# Patient Record
Sex: FEMALE | Race: WHITE | NOT HISPANIC OR LATINO
[De-identification: names, ages, dates, MRNs, and addresses within clinical notes are randomized per-mention and may not be internally consistent; named-entity substitution may affect disease eponyms.]

---

## 2017-03-17 ENCOUNTER — RECORD ABSTRACTING (OUTPATIENT)
Age: 57
End: 2017-03-17

## 2017-03-17 DIAGNOSIS — Z78.9 OTHER SPECIFIED HEALTH STATUS: ICD-10-CM

## 2017-03-17 DIAGNOSIS — Z78.0 ASYMPTOMATIC MENOPAUSAL STATE: ICD-10-CM

## 2017-03-17 DIAGNOSIS — Z92.89 PERSONAL HISTORY OF OTHER MEDICAL TREATMENT: ICD-10-CM

## 2017-03-17 DIAGNOSIS — Z30.41 ENCOUNTER FOR SURVEILLANCE OF CONTRACEPTIVE PILLS: ICD-10-CM

## 2017-03-28 ENCOUNTER — APPOINTMENT (OUTPATIENT)
Dept: BREAST CENTER | Facility: CLINIC | Age: 57
End: 2017-03-28

## 2017-04-06 ENCOUNTER — APPOINTMENT (OUTPATIENT)
Dept: BREAST CENTER | Facility: CLINIC | Age: 57
End: 2017-04-06

## 2017-04-06 VITALS
DIASTOLIC BLOOD PRESSURE: 82 MMHG | SYSTOLIC BLOOD PRESSURE: 132 MMHG | WEIGHT: 270 LBS | HEIGHT: 63 IN | BODY MASS INDEX: 47.84 KG/M2

## 2017-04-06 RX ORDER — TRIAMCINOLONE ACETONIDE 1 MG/G
0.1 OINTMENT TOPICAL
Qty: 80 | Refills: 0 | Status: DISCONTINUED | COMMUNITY
Start: 2017-03-30 | End: 2017-04-06

## 2017-04-14 ENCOUNTER — APPOINTMENT (OUTPATIENT)
Dept: HEMATOLOGY ONCOLOGY | Facility: CLINIC | Age: 57
End: 2017-04-14

## 2017-04-14 ENCOUNTER — OUTPATIENT (OUTPATIENT)
Dept: OUTPATIENT SERVICES | Facility: HOSPITAL | Age: 57
LOS: 1 days | Discharge: HOME | End: 2017-04-14

## 2017-04-14 VITALS
RESPIRATION RATE: 14 BRPM | HEART RATE: 78 BPM | BODY MASS INDEX: 48.02 KG/M2 | TEMPERATURE: 97.9 F | DIASTOLIC BLOOD PRESSURE: 72 MMHG | WEIGHT: 271 LBS | HEIGHT: 63 IN | SYSTOLIC BLOOD PRESSURE: 153 MMHG

## 2017-04-14 DIAGNOSIS — M79.89 OTHER SPECIFIED SOFT TISSUE DISORDERS: ICD-10-CM

## 2017-06-27 DIAGNOSIS — D05.11 INTRADUCTAL CARCINOMA IN SITU OF RIGHT BREAST: ICD-10-CM

## 2017-07-07 ENCOUNTER — APPOINTMENT (OUTPATIENT)
Dept: HEMATOLOGY ONCOLOGY | Facility: CLINIC | Age: 57
End: 2017-07-07

## 2017-09-21 ENCOUNTER — APPOINTMENT (OUTPATIENT)
Dept: BREAST CENTER | Facility: CLINIC | Age: 57
End: 2017-09-21

## 2018-04-05 ENCOUNTER — APPOINTMENT (OUTPATIENT)
Dept: BREAST CENTER | Facility: CLINIC | Age: 58
End: 2018-04-05
Payer: COMMERCIAL

## 2018-04-05 VITALS
WEIGHT: 271 LBS | BODY MASS INDEX: 48.02 KG/M2 | OXYGEN SATURATION: 98 % | SYSTOLIC BLOOD PRESSURE: 130 MMHG | DIASTOLIC BLOOD PRESSURE: 72 MMHG | HEART RATE: 79 BPM | HEIGHT: 63 IN

## 2018-04-05 PROCEDURE — 99214 OFFICE O/P EST MOD 30 MIN: CPT

## 2018-04-16 ENCOUNTER — OUTPATIENT (OUTPATIENT)
Dept: OUTPATIENT SERVICES | Facility: HOSPITAL | Age: 58
LOS: 1 days | Discharge: HOME | End: 2018-04-16

## 2018-04-16 DIAGNOSIS — Z02.9 ENCOUNTER FOR ADMINISTRATIVE EXAMINATIONS, UNSPECIFIED: ICD-10-CM

## 2018-04-17 ENCOUNTER — OUTPATIENT (OUTPATIENT)
Dept: OUTPATIENT SERVICES | Facility: HOSPITAL | Age: 58
LOS: 1 days | Discharge: HOME | End: 2018-04-17

## 2018-04-17 VITALS
SYSTOLIC BLOOD PRESSURE: 146 MMHG | HEART RATE: 67 BPM | TEMPERATURE: 97 F | HEIGHT: 63 IN | RESPIRATION RATE: 20 BRPM | DIASTOLIC BLOOD PRESSURE: 72 MMHG | OXYGEN SATURATION: 97 % | WEIGHT: 277.78 LBS

## 2018-04-17 DIAGNOSIS — N60.92 UNSPECIFIED BENIGN MAMMARY DYSPLASIA OF LEFT BREAST: ICD-10-CM

## 2018-04-17 DIAGNOSIS — Z98.890 OTHER SPECIFIED POSTPROCEDURAL STATES: Chronic | ICD-10-CM

## 2018-04-17 DIAGNOSIS — Z01.818 ENCOUNTER FOR OTHER PREPROCEDURAL EXAMINATION: ICD-10-CM

## 2018-04-17 DIAGNOSIS — R89.7 ABNORMAL HISTOLOGICAL FINDINGS IN SPECIMENS FROM OTHER ORGANS, SYSTEMS AND TISSUES: ICD-10-CM

## 2018-04-17 LAB
ALBUMIN SERPL ELPH-MCNC: 4 G/DL — SIGNIFICANT CHANGE UP (ref 3.5–5.2)
ALP SERPL-CCNC: 92 U/L — SIGNIFICANT CHANGE UP (ref 30–115)
ALT FLD-CCNC: 24 U/L — SIGNIFICANT CHANGE UP (ref 0–41)
ANION GAP SERPL CALC-SCNC: 13 MMOL/L — SIGNIFICANT CHANGE UP (ref 7–14)
APTT BLD: 36.5 SEC — SIGNIFICANT CHANGE UP (ref 27–39.2)
AST SERPL-CCNC: 33 U/L — SIGNIFICANT CHANGE UP (ref 0–41)
BASOPHILS # BLD AUTO: 0.05 K/UL — SIGNIFICANT CHANGE UP (ref 0–0.2)
BASOPHILS NFR BLD AUTO: 0.8 % — SIGNIFICANT CHANGE UP (ref 0–1)
BILIRUB SERPL-MCNC: 1.4 MG/DL — HIGH (ref 0.2–1.2)
BUN SERPL-MCNC: 9 MG/DL — LOW (ref 10–20)
CALCIUM SERPL-MCNC: 9.2 MG/DL — SIGNIFICANT CHANGE UP (ref 8.5–10.1)
CHLORIDE SERPL-SCNC: 95 MMOL/L — LOW (ref 98–110)
CO2 SERPL-SCNC: 28 MMOL/L — SIGNIFICANT CHANGE UP (ref 17–32)
CREAT SERPL-MCNC: 0.5 MG/DL — LOW (ref 0.7–1.5)
EOSINOPHIL # BLD AUTO: 0.2 K/UL — SIGNIFICANT CHANGE UP (ref 0–0.7)
EOSINOPHIL NFR BLD AUTO: 3.2 % — SIGNIFICANT CHANGE UP (ref 0–8)
GLUCOSE SERPL-MCNC: 99 MG/DL — SIGNIFICANT CHANGE UP (ref 70–99)
HCT VFR BLD CALC: 43.7 % — SIGNIFICANT CHANGE UP (ref 37–47)
HGB BLD-MCNC: 15.6 G/DL — SIGNIFICANT CHANGE UP (ref 12–16)
IMM GRANULOCYTES NFR BLD AUTO: 0.3 % — SIGNIFICANT CHANGE UP (ref 0.1–0.3)
INR BLD: 1.25 RATIO — SIGNIFICANT CHANGE UP (ref 0.65–1.3)
LYMPHOCYTES # BLD AUTO: 1.41 K/UL — SIGNIFICANT CHANGE UP (ref 1.2–3.4)
LYMPHOCYTES # BLD AUTO: 22.7 % — SIGNIFICANT CHANGE UP (ref 20.5–51.1)
MCHC RBC-ENTMCNC: 29.5 PG — SIGNIFICANT CHANGE UP (ref 27–31)
MCHC RBC-ENTMCNC: 35.7 G/DL — SIGNIFICANT CHANGE UP (ref 32–37)
MCV RBC AUTO: 82.8 FL — SIGNIFICANT CHANGE UP (ref 81–99)
MONOCYTES # BLD AUTO: 1.01 K/UL — HIGH (ref 0.1–0.6)
MONOCYTES NFR BLD AUTO: 16.3 % — HIGH (ref 1.7–9.3)
NEUTROPHILS # BLD AUTO: 3.51 K/UL — SIGNIFICANT CHANGE UP (ref 1.4–6.5)
NEUTROPHILS NFR BLD AUTO: 56.7 % — SIGNIFICANT CHANGE UP (ref 42.2–75.2)
NRBC # BLD: 0 /100 WBCS — SIGNIFICANT CHANGE UP (ref 0–0)
PLATELET # BLD AUTO: 131 K/UL — SIGNIFICANT CHANGE UP (ref 130–400)
POTASSIUM SERPL-MCNC: 3.8 MMOL/L — SIGNIFICANT CHANGE UP (ref 3.5–5)
POTASSIUM SERPL-SCNC: 3.8 MMOL/L — SIGNIFICANT CHANGE UP (ref 3.5–5)
PROT SERPL-MCNC: 6.7 G/DL — SIGNIFICANT CHANGE UP (ref 6–8)
PROTHROM AB SERPL-ACNC: 13.6 SEC — HIGH (ref 9.95–12.87)
RBC # BLD: 5.28 M/UL — SIGNIFICANT CHANGE UP (ref 4.2–5.4)
RBC # FLD: 13.5 % — SIGNIFICANT CHANGE UP (ref 11.5–14.5)
SODIUM SERPL-SCNC: 136 MMOL/L — SIGNIFICANT CHANGE UP (ref 135–146)
SURGICAL PATHOLOGY STUDY: SIGNIFICANT CHANGE UP
WBC # BLD: 6.2 K/UL — SIGNIFICANT CHANGE UP (ref 4.8–10.8)
WBC # FLD AUTO: 6.2 K/UL — SIGNIFICANT CHANGE UP (ref 4.8–10.8)

## 2018-04-17 NOTE — H&P PST ADULT - PMH
DM (diabetes mellitus)    HTN (hypertension)    Obesity    Smoker DM (diabetes mellitus)    HTN (hypertension)    Obesity  bmi 47  Smoker

## 2018-04-17 NOTE — H&P PST ADULT - REASON FOR ADMISSION
Pt denies cp palp uri cough dysuria or sob. ET 1 FOS denies SOB . PT denies any open wounds, drainage or rashes. Pt denies cp palp uri cough dysuria o ET 1 FOS or 1 flat block  with  SOB . PT denies any open wounds, drainage or rashes. scheduled for breast procedure

## 2018-04-18 DIAGNOSIS — E66.9 OBESITY, UNSPECIFIED: ICD-10-CM

## 2018-04-18 DIAGNOSIS — R92.1 MAMMOGRAPHIC CALCIFICATION FOUND ON DIAGNOSTIC IMAGING OF BREAST: ICD-10-CM

## 2018-04-23 ENCOUNTER — APPOINTMENT (OUTPATIENT)
Dept: BREAST CENTER | Facility: AMBULATORY SURGERY CENTER | Age: 58
End: 2018-04-23
Payer: COMMERCIAL

## 2018-04-30 VITALS
WEIGHT: 270.95 LBS | HEIGHT: 63 IN | HEART RATE: 79 BPM | OXYGEN SATURATION: 98 % | DIASTOLIC BLOOD PRESSURE: 72 MMHG | SYSTOLIC BLOOD PRESSURE: 130 MMHG

## 2018-04-30 NOTE — H&P PST ADULT - PMH
DM (diabetes mellitus)    HTN (hypertension)    Lobular carcinoma in situ (LCIS) of right breast    Obesity  bmi 47  Smoker

## 2018-04-30 NOTE — H&P PST ADULT - HISTORY OF PRESENT ILLNESS
57 year old female with right breast LCIS classical type on stereotactic biopsy 3/15/18; 8:00 (aung).

## 2018-05-07 ENCOUNTER — OUTPATIENT (OUTPATIENT)
Dept: OUTPATIENT SERVICES | Facility: HOSPITAL | Age: 58
LOS: 1 days | Discharge: HOME | End: 2018-05-07

## 2018-05-07 ENCOUNTER — LABORATORY RESULT (OUTPATIENT)
Age: 58
End: 2018-05-07

## 2018-05-07 DIAGNOSIS — Z98.890 OTHER SPECIFIED POSTPROCEDURAL STATES: Chronic | ICD-10-CM

## 2018-05-07 DIAGNOSIS — R89.7 ABNORMAL HISTOLOGICAL FINDINGS IN SPECIMENS FROM OTHER ORGANS, SYSTEMS AND TISSUES: ICD-10-CM

## 2018-05-20 ENCOUNTER — FORM ENCOUNTER (OUTPATIENT)
Age: 58
End: 2018-05-20

## 2018-05-21 ENCOUNTER — APPOINTMENT (OUTPATIENT)
Dept: BREAST CENTER | Facility: AMBULATORY SURGERY CENTER | Age: 58
End: 2018-05-21
Payer: COMMERCIAL

## 2018-05-21 ENCOUNTER — OUTPATIENT (OUTPATIENT)
Dept: OUTPATIENT SERVICES | Facility: HOSPITAL | Age: 58
LOS: 1 days | Discharge: HOME | End: 2018-05-21

## 2018-05-21 ENCOUNTER — RESULT REVIEW (OUTPATIENT)
Age: 58
End: 2018-05-21

## 2018-05-21 VITALS
HEIGHT: 63 IN | TEMPERATURE: 98 F | RESPIRATION RATE: 18 BRPM | SYSTOLIC BLOOD PRESSURE: 136 MMHG | WEIGHT: 270.07 LBS | HEART RATE: 68 BPM | DIASTOLIC BLOOD PRESSURE: 63 MMHG

## 2018-05-21 VITALS
RESPIRATION RATE: 16 BRPM | DIASTOLIC BLOOD PRESSURE: 65 MMHG | TEMPERATURE: 98 F | HEART RATE: 68 BPM | OXYGEN SATURATION: 97 % | SYSTOLIC BLOOD PRESSURE: 119 MMHG

## 2018-05-21 DIAGNOSIS — Z98.890 OTHER SPECIFIED POSTPROCEDURAL STATES: Chronic | ICD-10-CM

## 2018-05-21 PROCEDURE — 19301 PARTIAL MASTECTOMY: CPT | Mod: RT

## 2018-05-21 RX ORDER — ONDANSETRON 8 MG/1
4 TABLET, FILM COATED ORAL ONCE
Qty: 0 | Refills: 0 | Status: DISCONTINUED | OUTPATIENT
Start: 2018-05-21 | End: 2018-06-05

## 2018-05-21 RX ORDER — ACETAMINOPHEN 500 MG
650 TABLET ORAL ONCE
Qty: 0 | Refills: 0 | Status: DISCONTINUED | OUTPATIENT
Start: 2018-05-21 | End: 2018-06-05

## 2018-05-21 RX ORDER — SODIUM CHLORIDE 9 MG/ML
1000 INJECTION, SOLUTION INTRAVENOUS
Qty: 0 | Refills: 0 | Status: DISCONTINUED | OUTPATIENT
Start: 2018-05-21 | End: 2018-06-05

## 2018-05-21 RX ORDER — ACETAMINOPHEN 500 MG
1000 TABLET ORAL ONCE
Qty: 0 | Refills: 0 | Status: DISCONTINUED | OUTPATIENT
Start: 2018-05-21 | End: 2018-06-05

## 2018-05-21 RX ORDER — KETOROLAC TROMETHAMINE 30 MG/ML
30 SYRINGE (ML) INJECTION ONCE
Qty: 0 | Refills: 0 | Status: DISCONTINUED | OUTPATIENT
Start: 2018-05-21 | End: 2018-05-21

## 2018-05-21 RX ADMIN — SODIUM CHLORIDE 50 MILLILITER(S): 9 INJECTION, SOLUTION INTRAVENOUS at 12:27

## 2018-05-21 NOTE — ASU DISCHARGE PLAN (ADULT/PEDIATRIC). - SPECIAL INSTRUCTIONS
Regular Diet.    No heavy lifting more than 15 pounds for two weeks.    Please remove plastic dressings in three days.  Leave white tape in place.  May shower tomorrow.  Wear a supportive bra.

## 2018-05-21 NOTE — CHART NOTE - NSCHARTNOTEFT_GEN_A_CORE
PACU ANESTHESIA ADMISSION NOTE      Procedure: Lumpectomy of breast with needle localization: Right breast; of two areas.    Post op diagnosis:  Lobular carcinoma in situ (LCIS) of right breast      ____  Intubated  TV:______       Rate: ______      FiO2: ______    _x___  Patent Airway    _x___  Full return of protective reflexes    _x___  Full recovery from anesthesia / back to baseline status    Vitals:  BP:  101/57               HR: 72  RR: 12  Temp: 98.1  O2Sat: 98    Mental Status:  _x___ Awake   _____ Alert   _____ Drowsy   _____ Sedated    Nausea/Vomiting:  _x___  NO       ______Yes,   See Post - Op Orders         Pain Scale (0-10):  __0___    Treatment: _x___ None    ____ See Post - Op/PCA Orders    Post - Operative Fluids:   __x__ Oral   ____ See Post - Op Orders    Plan: Discharge:   _x___Home       _____Floor     _____Critical Care    _____  Other:_________________    Comments:  Intraoperative course uneventful.  No anesthesia issues or complications noted.  Discharge when criteria met.

## 2018-05-21 NOTE — BRIEF OPERATIVE NOTE - PROCEDURE
<<-----Click on this checkbox to enter Procedure Lumpectomy of breast with needle localization  05/21/2018  Right breast; of two areas.  Active  [email or web address]

## 2018-05-25 ENCOUNTER — OUTPATIENT (OUTPATIENT)
Dept: OUTPATIENT SERVICES | Facility: HOSPITAL | Age: 58
LOS: 1 days | Discharge: HOME | End: 2018-05-25

## 2018-05-25 VITALS
OXYGEN SATURATION: 96 % | RESPIRATION RATE: 16 BRPM | SYSTOLIC BLOOD PRESSURE: 130 MMHG | DIASTOLIC BLOOD PRESSURE: 76 MMHG | HEART RATE: 68 BPM | WEIGHT: 270.07 LBS | TEMPERATURE: 98 F

## 2018-05-25 DIAGNOSIS — Z01.818 ENCOUNTER FOR OTHER PREPROCEDURAL EXAMINATION: ICD-10-CM

## 2018-05-25 DIAGNOSIS — Z98.890 OTHER SPECIFIED POSTPROCEDURAL STATES: Chronic | ICD-10-CM

## 2018-05-25 DIAGNOSIS — N85.00 ENDOMETRIAL HYPERPLASIA, UNSPECIFIED: ICD-10-CM

## 2018-05-25 LAB
APPEARANCE UR: (no result)
BACTERIA # UR AUTO: (no result) /HPF
BILIRUB UR-MCNC: NEGATIVE — SIGNIFICANT CHANGE UP
COLOR SPEC: YELLOW — SIGNIFICANT CHANGE UP
DIFF PNL FLD: NEGATIVE — SIGNIFICANT CHANGE UP
EPI CELLS # UR: (no result) /HPF
GLUCOSE UR QL: NEGATIVE MG/DL — SIGNIFICANT CHANGE UP
KETONES UR-MCNC: NEGATIVE — SIGNIFICANT CHANGE UP
LEUKOCYTE ESTERASE UR-ACNC: (no result)
NITRITE UR-MCNC: NEGATIVE — SIGNIFICANT CHANGE UP
PH UR: 6.5 — SIGNIFICANT CHANGE UP (ref 5–8)
PROT UR-MCNC: NEGATIVE MG/DL — SIGNIFICANT CHANGE UP
SP GR SPEC: 1.01 — SIGNIFICANT CHANGE UP (ref 1.01–1.03)
SURGICAL PATHOLOGY STUDY: SIGNIFICANT CHANGE UP
UROBILINOGEN FLD QL: 1 MG/DL (ref 0.2–0.2)
WBC UR QL: SIGNIFICANT CHANGE UP /HPF

## 2018-05-25 NOTE — H&P PST ADULT - EXTREMITIES COMMENTS
hyperpigmentation and oedema b/l le, dressing intact RT LE, gait slow with leaned forward and bent position

## 2018-05-25 NOTE — H&P PST ADULT - PMH
Chronic lower back pain  SCIATICA, PAIN RADIATING TO B/L LE  DM (diabetes mellitus)    HTN (hypertension)    Lobular carcinoma in situ (LCIS) of right breast    Obesity  bmi 47  Smoker    Venous insufficiency  ? CHR ULCER RT LE

## 2018-05-25 NOTE — H&P PST ADULT - HISTORY OF PRESENT ILLNESS
57YOF, States was told she had thickened lining of the uterus, presents to pretesting for D&C AND HYSTEROSCOPY. Denies c/o cp , palp, sob, uri , fever ,rash or uti symptoms. Exercise jose 1 flat block LTD BY PAIN IN HIPS, AND BACK RADIATING  TO B/L LE

## 2018-05-25 NOTE — H&P PST ADULT - NSANTHOSAYNRD_GEN_A_CORE
No. OG screening performed.  STOP BANG Legend: 0-2 = LOW Risk; 3-4 = INTERMEDIATE Risk; 5-8 = HIGH Risk

## 2018-05-29 ENCOUNTER — APPOINTMENT (OUTPATIENT)
Dept: BREAST CENTER | Facility: CLINIC | Age: 58
End: 2018-05-29
Payer: COMMERCIAL

## 2018-05-29 VITALS
DIASTOLIC BLOOD PRESSURE: 80 MMHG | HEART RATE: 76 BPM | SYSTOLIC BLOOD PRESSURE: 128 MMHG | WEIGHT: 271 LBS | HEIGHT: 63 IN | BODY MASS INDEX: 48.02 KG/M2 | OXYGEN SATURATION: 98 %

## 2018-05-29 DIAGNOSIS — N60.21 FIBROADENOSIS OF RIGHT BREAST: ICD-10-CM

## 2018-05-29 DIAGNOSIS — N64.2 ATROPHY OF BREAST: ICD-10-CM

## 2018-05-29 DIAGNOSIS — D05.01 LOBULAR CARCINOMA IN SITU OF RIGHT BREAST: ICD-10-CM

## 2018-05-29 DIAGNOSIS — E66.9 OBESITY, UNSPECIFIED: ICD-10-CM

## 2018-05-29 DIAGNOSIS — E11.9 TYPE 2 DIABETES MELLITUS WITHOUT COMPLICATIONS: ICD-10-CM

## 2018-05-29 DIAGNOSIS — F17.210 NICOTINE DEPENDENCE, CIGARETTES, UNCOMPLICATED: ICD-10-CM

## 2018-05-29 DIAGNOSIS — I10 ESSENTIAL (PRIMARY) HYPERTENSION: ICD-10-CM

## 2018-05-29 DIAGNOSIS — N60.41 MAMMARY DUCT ECTASIA OF RIGHT BREAST: ICD-10-CM

## 2018-05-29 PROCEDURE — 99024 POSTOP FOLLOW-UP VISIT: CPT

## 2018-06-08 ENCOUNTER — RESULT REVIEW (OUTPATIENT)
Age: 58
End: 2018-06-08

## 2018-06-08 ENCOUNTER — OUTPATIENT (OUTPATIENT)
Dept: OUTPATIENT SERVICES | Facility: HOSPITAL | Age: 58
LOS: 1 days | Discharge: HOME | End: 2018-06-08

## 2018-06-08 VITALS
OXYGEN SATURATION: 99 % | HEART RATE: 66 BPM | RESPIRATION RATE: 17 BRPM | DIASTOLIC BLOOD PRESSURE: 60 MMHG | SYSTOLIC BLOOD PRESSURE: 123 MMHG

## 2018-06-08 VITALS
OXYGEN SATURATION: 95 % | RESPIRATION RATE: 18 BRPM | SYSTOLIC BLOOD PRESSURE: 134 MMHG | HEART RATE: 65 BPM | WEIGHT: 270.07 LBS | DIASTOLIC BLOOD PRESSURE: 62 MMHG | TEMPERATURE: 98 F

## 2018-06-08 DIAGNOSIS — Z98.890 OTHER SPECIFIED POSTPROCEDURAL STATES: Chronic | ICD-10-CM

## 2018-06-08 RX ORDER — OXYCODONE AND ACETAMINOPHEN 5; 325 MG/1; MG/1
1 TABLET ORAL EVERY 4 HOURS
Qty: 0 | Refills: 0 | Status: DISCONTINUED | OUTPATIENT
Start: 2018-06-08 | End: 2018-06-08

## 2018-06-08 RX ORDER — MORPHINE SULFATE 50 MG/1
4 CAPSULE, EXTENDED RELEASE ORAL ONCE
Qty: 0 | Refills: 0 | Status: DISCONTINUED | OUTPATIENT
Start: 2018-06-08 | End: 2018-06-08

## 2018-06-08 RX ORDER — SODIUM CHLORIDE 9 MG/ML
1000 INJECTION, SOLUTION INTRAVENOUS
Qty: 0 | Refills: 0 | Status: DISCONTINUED | OUTPATIENT
Start: 2018-06-08 | End: 2018-06-23

## 2018-06-08 RX ORDER — ONDANSETRON 8 MG/1
4 TABLET, FILM COATED ORAL ONCE
Qty: 0 | Refills: 0 | Status: DISCONTINUED | OUTPATIENT
Start: 2018-06-08 | End: 2018-06-23

## 2018-06-08 RX ADMIN — SODIUM CHLORIDE 100 MILLILITER(S): 9 INJECTION, SOLUTION INTRAVENOUS at 10:52

## 2018-06-08 NOTE — BRIEF OPERATIVE NOTE - OPERATION/FINDINGS
EUA; 8w size, anteverted uterus, closed cervix. Normal adnexa bilaterally. Hysteroscopy: 0.5cm polyp noted adjacent to each ostia. Bilateral ostia visualized.

## 2018-06-08 NOTE — BRIEF OPERATIVE NOTE - PROCEDURE
<<-----Click on this checkbox to enter Procedure Dilation and curettage  06/08/2018    Active  SCHAKRAVAR  Hysteroscopic polypectomy using MyoSure tissue removal system  06/08/2018    Active  SCHAKRAVAR

## 2018-06-08 NOTE — ASU DISCHARGE PLAN (ADULT/PEDIATRIC). - SPECIAL INSTRUCTIONS
-Regular activity and diet  -Motrin 600mg for pain every 6 hours as needed  -Nothing in the vagina for 2 weeks - no sex, tampons, douching, tub baths, or pools. May shower.  -follow up in 2 weeks with Dr. Richardson for post-operative check

## 2018-06-08 NOTE — BRIEF OPERATIVE NOTE - POST-OP DX
Endometrial hyperplasia  06/08/2018    Laurence Luther  Uterine polyp  06/08/2018    Laurence Luther

## 2018-06-08 NOTE — CHART NOTE - NSCHARTNOTEFT_GEN_A_CORE
PACU ANESTHESIA ADMISSION NOTE      Procedure:   Post op diagnosis:      ____  Intubated  TV:______       Rate: ______      FiO2: ______    _x___  Patent Airway    _x___  Full return of protective reflexes    _x___  Full recovery from anesthesia / back to baseline status    Vitals:  T(C): 36.8 (06-08-18 @ 09:25), Max: 36.8 (06-08-18 @ 09:03)  HR: 65 (06-08-18 @ 09:25) (65 - 65)  BP: 134/62 (06-08-18 @ 09:25) (134/62 - 134/62)  RR: 18 (06-08-18 @ 09:25) (18 - 18)  SpO2: 95% (06-08-18 @ 09:25) (95% - 95%)    Mental Status:  _x___ Awake   _____ Alert   _____ Drowsy   _____ Sedated    Nausea/Vomiting:  _x___  NO       ______Yes,   See Post - Op Orders         Pain Scale (0-10):  __0___    Treatment: _x___ None    ____ See Post - Op/PCA Orders    Post - Operative Fluids:   __x__ Oral   ____ See Post - Op Orders    Plan: Discharge:   _x___Home       _____Floor     _____Critical Care    _____  Other:_________________    Comments:  No anesthesia issues or complications noted.  Discharge when criteria met.

## 2018-06-12 LAB — SURGICAL PATHOLOGY STUDY: SIGNIFICANT CHANGE UP

## 2018-06-13 DIAGNOSIS — N85.00 ENDOMETRIAL HYPERPLASIA, UNSPECIFIED: ICD-10-CM

## 2018-06-13 DIAGNOSIS — N84.0 POLYP OF CORPUS UTERI: ICD-10-CM

## 2018-06-13 DIAGNOSIS — D05.01 LOBULAR CARCINOMA IN SITU OF RIGHT BREAST: ICD-10-CM

## 2018-06-13 DIAGNOSIS — N85.8 OTHER SPECIFIED NONINFLAMMATORY DISORDERS OF UTERUS: ICD-10-CM

## 2018-06-13 DIAGNOSIS — I10 ESSENTIAL (PRIMARY) HYPERTENSION: ICD-10-CM

## 2018-06-13 DIAGNOSIS — E66.9 OBESITY, UNSPECIFIED: ICD-10-CM

## 2018-06-13 DIAGNOSIS — F17.210 NICOTINE DEPENDENCE, CIGARETTES, UNCOMPLICATED: ICD-10-CM

## 2018-06-13 DIAGNOSIS — E11.9 TYPE 2 DIABETES MELLITUS WITHOUT COMPLICATIONS: ICD-10-CM

## 2018-06-22 ENCOUNTER — APPOINTMENT (OUTPATIENT)
Dept: HEMATOLOGY ONCOLOGY | Facility: CLINIC | Age: 58
End: 2018-06-22

## 2018-07-05 ENCOUNTER — APPOINTMENT (OUTPATIENT)
Dept: HEMATOLOGY ONCOLOGY | Facility: CLINIC | Age: 58
End: 2018-07-05

## 2018-07-05 ENCOUNTER — OUTPATIENT (OUTPATIENT)
Dept: OUTPATIENT SERVICES | Facility: HOSPITAL | Age: 58
LOS: 1 days | Discharge: HOME | End: 2018-07-05

## 2018-07-05 VITALS
HEART RATE: 73 BPM | DIASTOLIC BLOOD PRESSURE: 88 MMHG | SYSTOLIC BLOOD PRESSURE: 159 MMHG | BODY MASS INDEX: 49.61 KG/M2 | HEIGHT: 63 IN | WEIGHT: 280 LBS | RESPIRATION RATE: 16 BRPM

## 2018-07-05 DIAGNOSIS — Z98.890 OTHER SPECIFIED POSTPROCEDURAL STATES: Chronic | ICD-10-CM

## 2018-07-06 DIAGNOSIS — D05.01 LOBULAR CARCINOMA IN SITU OF RIGHT BREAST: ICD-10-CM

## 2018-07-17 PROBLEM — E66.9 OBESITY, UNSPECIFIED: Chronic | Status: ACTIVE | Noted: 2018-04-17

## 2018-07-22 PROBLEM — Z78.9 ALCOHOL USE: Status: ACTIVE | Noted: 2017-03-17

## 2018-08-01 ENCOUNTER — TRANSCRIPTION ENCOUNTER (OUTPATIENT)
Age: 58
End: 2018-08-01

## 2018-12-06 ENCOUNTER — APPOINTMENT (OUTPATIENT)
Dept: BREAST CENTER | Facility: CLINIC | Age: 58
End: 2018-12-06
Payer: COMMERCIAL

## 2018-12-06 VITALS
WEIGHT: 280 LBS | HEIGHT: 63 IN | BODY MASS INDEX: 49.61 KG/M2 | DIASTOLIC BLOOD PRESSURE: 82 MMHG | SYSTOLIC BLOOD PRESSURE: 134 MMHG | OXYGEN SATURATION: 98 % | HEART RATE: 88 BPM

## 2018-12-06 PROBLEM — F17.200 NICOTINE DEPENDENCE, UNSPECIFIED, UNCOMPLICATED: Chronic | Status: ACTIVE | Noted: 2018-04-17

## 2018-12-06 PROBLEM — E11.9 TYPE 2 DIABETES MELLITUS WITHOUT COMPLICATIONS: Chronic | Status: ACTIVE | Noted: 2018-04-17

## 2018-12-06 PROBLEM — M54.5 LOW BACK PAIN: Chronic | Status: ACTIVE | Noted: 2018-05-25

## 2018-12-06 PROBLEM — D05.01 LOBULAR CARCINOMA IN SITU OF RIGHT BREAST: Chronic | Status: ACTIVE | Noted: 2018-04-30

## 2018-12-06 PROBLEM — I10 ESSENTIAL (PRIMARY) HYPERTENSION: Chronic | Status: ACTIVE | Noted: 2018-04-17

## 2018-12-06 PROCEDURE — 99212 OFFICE O/P EST SF 10 MIN: CPT

## 2019-01-08 NOTE — ASU DISCHARGE PLAN (ADULT/PEDIATRIC). - DISCHARGE DATE
08-Jun-2018 10:48 Principal Discharge DX:	MURCIA (dyspnea on exertion)  Goal:	improvement  Assessment and plan of treatment:	use inhaler and complete prednisone

## 2019-01-10 ENCOUNTER — APPOINTMENT (OUTPATIENT)
Dept: HEMATOLOGY ONCOLOGY | Facility: CLINIC | Age: 59
End: 2019-01-10

## 2019-01-10 ENCOUNTER — OUTPATIENT (OUTPATIENT)
Dept: OUTPATIENT SERVICES | Facility: HOSPITAL | Age: 59
LOS: 1 days | Discharge: HOME | End: 2019-01-10

## 2019-01-10 VITALS
TEMPERATURE: 97.4 F | HEIGHT: 63 IN | RESPIRATION RATE: 14 BRPM | HEART RATE: 76 BPM | WEIGHT: 280 LBS | SYSTOLIC BLOOD PRESSURE: 154 MMHG | DIASTOLIC BLOOD PRESSURE: 86 MMHG | BODY MASS INDEX: 49.61 KG/M2

## 2019-01-10 DIAGNOSIS — Z98.890 OTHER SPECIFIED POSTPROCEDURAL STATES: Chronic | ICD-10-CM

## 2019-01-10 NOTE — REVIEW OF SYSTEMS
[Negative] : Neurological [de-identified] : Lower leg erythematous spots on right leg, and right sude if face, over the right eye

## 2019-01-10 NOTE — HISTORY OF PRESENT ILLNESS
[de-identified] : 58-year-old female with a history of lobular carcinoma in situ of the right breast and focal atypical ductal hyperplasia of the left breast status post bilateral lumpectomy.  She has been on tamoxifen since 12/2013 and tolerated well.  She is menopausal.  On 08/02/2016, she had a bilateral breast MRI with and without contrast at [facility].  There was no abnormal finding.  On 01/08/2016, she had a bilateral screening mammogram.  There was no abnormal finding.\par  [de-identified] : The patient had a screening mammogram on 3/8/2018 where calcifications were identified within the 8:00 axis of the right breast at posterior one third depth. \par \par She was subsequently underwent a diagnostic mammogram performed on the same day. Spot magnification views demonstrated a 0.5 cm of grouped heterogeneous \par calcifications within the right breast at 8:00, posterior one third depth for which a stereotactic biopsy was recommended. \par \par A stereotactic biopsy performed on 3/15/2018 demonstrated the pathology results of LCIS. At the time of the biopsy, a buckle shaped biopsy marking clip was deployed. Post procedural mammogram is suboptimal in the CC projection due to positioning, making it difficult to determine if the clip is in appropriate position relative to the target site. Repeat CC \par recommended prior to needle localization. \par \par Additionally, upon evaluation of prior mammogram dated 3/8/2018, there are grouped coarse heterogeneous calcifications spanning 0.3 cm within the upper \par outer quadrant of the right breast at middle one third depth for which stereotactic biopsy is recommended for further evaluation \par Recommendations/Contemplated Plan of Action: \par 1. Indeterminate right breast upper outer quadrant calcifications for which \par stereotactic biopsy is recommended. \par 2. A mammographically guided wire localization of the buckle shaped clip \par may be performed on the day of surgery, pending repeat CC view to determine \par if the clip is in appropriate position relative to the target site. \par \par On 5/21/18, she had right breast lumpectomy. HISTOLOGY: FINAL DIAGNOSIS \par 1. BREAST, RIGHT ANTERIOR MASS, NEEDLE LOCALIZED LUMPECTOMY: \par - FOCAL LOBULAR CARCINOMA IN-SITU (LCIS), CLASSICAL TYPE AND ATYPICAL \par LOBULAR HYPERPLASIA (ALH); ASSOCIATED WITH RARE MICROCALCIFICATIONS. \par - ATROPHIC FATTY BREAST TISSUE WITH MILDLY PROLIFERATIVE TYPE FIBROCYTIC \par CHANGES, FOCAL MILD VASCULAR MEDIAL CALCIFIC SCLEROSIS (MONCKEBERG'S), AND A \par HEALING PRIOR BIOPSY SITE ASSOCIATED WITH ORGANIZING HEMATOMA FORMATION. \par \par 2. BREAST, RIGHT POSTERIOR MASS, NEEDLE LOCALIZED LUMPECTOMY: \par - SINGLE MICROSCOPIC FOCUS OF ATYPICAL DUCT HYPERPLASIA (ADH), SOLID TYPE, \par 0.8 MM, LOCATED 2.0 MM FROM THE NEAREST INFERIOR INKED SURGICAL MARGIN \par (MICROSCOPIC MEASUREMENTS). \par - FOCAL LOBULAR CARCINOMA IN-SITU (LCIS), CLASSICAL TYPE AND ATYPICAL \par LOBULAR HYPERPLASIA (ALH). \par - ATROPHIC FATTY BREAST TISSUE WITH PROLIFERATIVE TYPE \par FIBROCYSTIC CHANGES, FOCAL MAMMARY DUCT ECTASIA, AND A HEALING PRIOR BIOPSY \par SITE. \par \par 1/10/19:\par The patient is here for f/u visit. She is taking tamoxifen for LCIS since 12/2013. She had right breast lumpectomy x 2 in 5/2018 which showed LCIS, classical type and ALH. She complains b/l leg pain more when she is walking and better when her legs were elevated. She had endometrial biopsy and D&C in 6 2018. There was no evidence of malignancy.

## 2019-01-10 NOTE — ASSESSMENT
[FreeTextEntry1] : ASSESSMENT: \par 1. LCIS right breast, s/p lumpectomy in 2013 and 2018\par 2. ADH left breast\par 3. b/l leg pain.\par 4. BCC skin LE\par \par PLAN: \par -- The patient has been on Tamoxifen for 5 years. However, she had recurrent LCIS in 2018. We discussed the options to switch to Anastrozole or Raloxifene. \par -- Continue b/l breast imaging surveillance.\par -- Will refer her to see vascular specialist to evaluate b/l leg pain.\par -- RTO for followup in 6 months.

## 2019-01-10 NOTE — PHYSICAL EXAM
[Restricted in physically strenuous activity but ambulatory and able to carry out work of a light or sedentary nature] : Status 1- Restricted in physically strenuous activity but ambulatory and able to carry out work of a light or sedentary nature, e.g., light house work, office work [Normal] : normoactive bowel sounds, soft and nontender, no hepatosplenomegaly or masses appreciated [de-identified] : Right breast lumpectomy scar, There is no palpable mass or skin lesion

## 2019-01-10 NOTE — CONSULT LETTER
[Dear  ___] : Dear  [unfilled], [Courtesy Letter:] : I had the pleasure of seeing your patient, [unfilled], in my office today. [Please see my note below.] : Please see my note below. [Sincerely,] : Sincerely, [FreeTextEntry3] : Breana Yusuf MD

## 2019-01-11 DIAGNOSIS — Z79.810 LONG TERM (CURRENT) USE OF SELECTIVE ESTROGEN RECEPTOR MODULATORS (SERMS): ICD-10-CM

## 2019-01-11 DIAGNOSIS — D05.01 LOBULAR CARCINOMA IN SITU OF RIGHT BREAST: ICD-10-CM

## 2019-03-17 ENCOUNTER — TRANSCRIPTION ENCOUNTER (OUTPATIENT)
Age: 59
End: 2019-03-17

## 2019-04-16 ENCOUNTER — OUTPATIENT (OUTPATIENT)
Dept: OUTPATIENT SERVICES | Facility: HOSPITAL | Age: 59
LOS: 1 days | Discharge: HOME | End: 2019-04-16
Payer: COMMERCIAL

## 2019-04-16 VITALS
SYSTOLIC BLOOD PRESSURE: 147 MMHG | OXYGEN SATURATION: 100 % | WEIGHT: 274.03 LBS | HEIGHT: 63 IN | TEMPERATURE: 98 F | HEART RATE: 67 BPM | RESPIRATION RATE: 16 BRPM | DIASTOLIC BLOOD PRESSURE: 67 MMHG

## 2019-04-16 DIAGNOSIS — Z01.818 ENCOUNTER FOR OTHER PREPROCEDURAL EXAMINATION: ICD-10-CM

## 2019-04-16 DIAGNOSIS — Z98.890 OTHER SPECIFIED POSTPROCEDURAL STATES: Chronic | ICD-10-CM

## 2019-04-16 DIAGNOSIS — N95.0 POSTMENOPAUSAL BLEEDING: ICD-10-CM

## 2019-04-16 LAB
ALBUMIN SERPL ELPH-MCNC: 4 G/DL — SIGNIFICANT CHANGE UP (ref 3.5–5.2)
ALP SERPL-CCNC: 78 U/L — SIGNIFICANT CHANGE UP (ref 30–115)
ALT FLD-CCNC: 26 U/L — SIGNIFICANT CHANGE UP (ref 0–41)
ANION GAP SERPL CALC-SCNC: 14 MMOL/L — SIGNIFICANT CHANGE UP (ref 7–14)
APPEARANCE UR: ABNORMAL
APTT BLD: 37 SEC — SIGNIFICANT CHANGE UP (ref 27–39.2)
AST SERPL-CCNC: 33 U/L — SIGNIFICANT CHANGE UP (ref 0–41)
BACTERIA # UR AUTO: ABNORMAL /HPF
BASOPHILS # BLD AUTO: 0.16 K/UL — SIGNIFICANT CHANGE UP (ref 0–0.2)
BASOPHILS NFR BLD AUTO: 3 % — HIGH (ref 0–1)
BILIRUB SERPL-MCNC: 1 MG/DL — SIGNIFICANT CHANGE UP (ref 0.2–1.2)
BILIRUB UR-MCNC: NEGATIVE — SIGNIFICANT CHANGE UP
BUN SERPL-MCNC: 8 MG/DL — LOW (ref 10–20)
CALCIUM SERPL-MCNC: 9.2 MG/DL — SIGNIFICANT CHANGE UP (ref 8.5–10.1)
CHLORIDE SERPL-SCNC: 96 MMOL/L — LOW (ref 98–110)
CO2 SERPL-SCNC: 26 MMOL/L — SIGNIFICANT CHANGE UP (ref 17–32)
COLOR SPEC: SIGNIFICANT CHANGE UP
CREAT SERPL-MCNC: <0.5 MG/DL — LOW (ref 0.7–1.5)
DIFF PNL FLD: NEGATIVE — SIGNIFICANT CHANGE UP
EOSINOPHIL # BLD AUTO: 0.26 K/UL — SIGNIFICANT CHANGE UP (ref 0–0.7)
EOSINOPHIL NFR BLD AUTO: 5 % — SIGNIFICANT CHANGE UP (ref 0–8)
ESTIMATED AVERAGE GLUCOSE: 126 MG/DL — HIGH (ref 68–114)
GIANT PLATELETS BLD QL SMEAR: PRESENT — SIGNIFICANT CHANGE UP
GLUCOSE SERPL-MCNC: 113 MG/DL — HIGH (ref 70–99)
GLUCOSE UR QL: NEGATIVE MG/DL — SIGNIFICANT CHANGE UP
HBA1C BLD-MCNC: 6 % — HIGH (ref 4–5.6)
HCT VFR BLD CALC: 41.6 % — SIGNIFICANT CHANGE UP (ref 37–47)
HGB BLD-MCNC: 14.6 G/DL — SIGNIFICANT CHANGE UP (ref 12–16)
HYPOCHROMIA BLD QL: SLIGHT — SIGNIFICANT CHANGE UP
INR BLD: 1.42 RATIO — HIGH (ref 0.65–1.3)
KETONES UR-MCNC: NEGATIVE — SIGNIFICANT CHANGE UP
LEUKOCYTE ESTERASE UR-ACNC: ABNORMAL
LYMPHOCYTES # BLD AUTO: 1.15 K/UL — LOW (ref 1.2–3.4)
LYMPHOCYTES # BLD AUTO: 22 % — SIGNIFICANT CHANGE UP (ref 20.5–51.1)
MANUAL SMEAR VERIFICATION: SIGNIFICANT CHANGE UP
MCHC RBC-ENTMCNC: 30.1 PG — SIGNIFICANT CHANGE UP (ref 27–31)
MCHC RBC-ENTMCNC: 35.1 G/DL — SIGNIFICANT CHANGE UP (ref 32–37)
MCV RBC AUTO: 85.8 FL — SIGNIFICANT CHANGE UP (ref 81–99)
MONOCYTES # BLD AUTO: 0.42 K/UL — SIGNIFICANT CHANGE UP (ref 0.1–0.6)
MONOCYTES NFR BLD AUTO: 8 % — SIGNIFICANT CHANGE UP (ref 1.7–9.3)
NEUTROPHILS # BLD AUTO: 3.23 K/UL — SIGNIFICANT CHANGE UP (ref 1.4–6.5)
NEUTROPHILS NFR BLD AUTO: 62 % — SIGNIFICANT CHANGE UP (ref 42.2–75.2)
NITRITE UR-MCNC: POSITIVE
NRBC # BLD: 0 /100 — SIGNIFICANT CHANGE UP (ref 0–0)
NRBC # BLD: SIGNIFICANT CHANGE UP /100 WBCS (ref 0–0)
PH UR: 6.5 — SIGNIFICANT CHANGE UP (ref 5–8)
PLAT MORPH BLD: ABNORMAL
PLATELET # BLD AUTO: 77 K/UL — LOW (ref 130–400)
POTASSIUM SERPL-MCNC: 3.4 MMOL/L — LOW (ref 3.5–5)
POTASSIUM SERPL-SCNC: 3.4 MMOL/L — LOW (ref 3.5–5)
PROT SERPL-MCNC: 6.3 G/DL — SIGNIFICANT CHANGE UP (ref 6–8)
PROT UR-MCNC: ABNORMAL MG/DL
PROTHROM AB SERPL-ACNC: 16.3 SEC — HIGH (ref 9.95–12.87)
RBC # BLD: 4.85 M/UL — SIGNIFICANT CHANGE UP (ref 4.2–5.4)
RBC # FLD: 13.4 % — SIGNIFICANT CHANGE UP (ref 11.5–14.5)
RBC BLD AUTO: ABNORMAL
SODIUM SERPL-SCNC: 136 MMOL/L — SIGNIFICANT CHANGE UP (ref 135–146)
SP GR SPEC: 1.02 — SIGNIFICANT CHANGE UP (ref 1.01–1.03)
UROBILINOGEN FLD QL: 1 MG/DL (ref 0.2–0.2)
WBC # BLD: 5.21 K/UL — SIGNIFICANT CHANGE UP (ref 4.8–10.8)
WBC # FLD AUTO: 5.21 K/UL — SIGNIFICANT CHANGE UP (ref 4.8–10.8)
WBC UR QL: SIGNIFICANT CHANGE UP /HPF

## 2019-04-16 PROCEDURE — 93010 ELECTROCARDIOGRAM REPORT: CPT

## 2019-04-16 NOTE — H&P PST ADULT - NSICDXFAMILYHX_GEN_ALL_CORE_FT
FAMILY HISTORY:  Father  Still living? Unknown  Family history of colon cancer, Age at diagnosis: Age Unknown

## 2019-04-16 NOTE — H&P PST ADULT - REASON FOR ADMISSION
PT PRESENTS TO PAST WITH NO SOB, CP, PALPITATIONS, DYSURIA, UTI OR URI AT PRESENT.   PT ABLE TO WALK UP 1--FLIGHTS OF STEPS WITH NO SOB.  AS PER THE PT, THIS IS HIS/HER COMPLETE MEDICAL AND SURGICAL HX, INCLUDING MEDICATIONS PRESCRIBED AND OVER THE COUNTER  PT PRESENTS TO PAST WITH POSSIBLE ENDOMETRIAL POLYP.

## 2019-04-16 NOTE — H&P PST ADULT - NSICDXPASTMEDICALHX_GEN_ALL_CORE_FT
PAST MEDICAL HISTORY:  Chronic lower back pain SCIATICA, PAIN RADIATING TO B/L LE    DM (diabetes mellitus)     HTN (hypertension)     Lobular carcinoma in situ (LCIS) of right breast     OA (osteoarthritis)     Obesity bmi 47    Smoker     Venous insufficiency ? CHR ULCER RT LE

## 2019-04-16 NOTE — H&P PST ADULT - RS GEN PE MLT RESP DETAILS PC
breath sounds equal/good air movement/respirations non-labored/no chest wall tenderness/normal/airway patent/clear to auscultation bilaterally

## 2019-04-17 DIAGNOSIS — E11.9 TYPE 2 DIABETES MELLITUS WITHOUT COMPLICATIONS: ICD-10-CM

## 2019-04-17 DIAGNOSIS — E66.9 OBESITY, UNSPECIFIED: ICD-10-CM

## 2019-04-17 DIAGNOSIS — I10 ESSENTIAL (PRIMARY) HYPERTENSION: ICD-10-CM

## 2019-04-25 NOTE — ASU PATIENT PROFILE, ADULT - PMH
Chronic lower back pain  SCIATICA, PAIN RADIATING TO B/L LE  DM (diabetes mellitus)    HTN (hypertension)    Lobular carcinoma in situ (LCIS) of right breast    OA (osteoarthritis)    Obesity  bmi 47  Smoker    Venous insufficiency  ? CHR ULCER RT LE

## 2019-04-26 ENCOUNTER — OUTPATIENT (OUTPATIENT)
Dept: OUTPATIENT SERVICES | Facility: HOSPITAL | Age: 59
LOS: 1 days | Discharge: HOME | End: 2019-04-26
Payer: COMMERCIAL

## 2019-04-26 ENCOUNTER — RESULT REVIEW (OUTPATIENT)
Age: 59
End: 2019-04-26

## 2019-04-26 VITALS
SYSTOLIC BLOOD PRESSURE: 119 MMHG | OXYGEN SATURATION: 97 % | DIASTOLIC BLOOD PRESSURE: 55 MMHG | HEART RATE: 78 BPM | TEMPERATURE: 98 F | RESPIRATION RATE: 20 BRPM

## 2019-04-26 VITALS
OXYGEN SATURATION: 95 % | DIASTOLIC BLOOD PRESSURE: 61 MMHG | WEIGHT: 274.03 LBS | RESPIRATION RATE: 17 BRPM | HEIGHT: 63 IN | SYSTOLIC BLOOD PRESSURE: 125 MMHG | HEART RATE: 68 BPM | TEMPERATURE: 99 F

## 2019-04-26 DIAGNOSIS — Z98.890 OTHER SPECIFIED POSTPROCEDURAL STATES: Chronic | ICD-10-CM

## 2019-04-26 LAB — GLUCOSE BLDC GLUCOMTR-MCNC: 127 MG/DL — HIGH (ref 70–99)

## 2019-04-26 PROCEDURE — 88305 TISSUE EXAM BY PATHOLOGIST: CPT | Mod: 26

## 2019-04-26 RX ORDER — SODIUM CHLORIDE 9 MG/ML
1000 INJECTION, SOLUTION INTRAVENOUS
Qty: 0 | Refills: 0 | Status: DISCONTINUED | OUTPATIENT
Start: 2019-04-26 | End: 2019-05-11

## 2019-04-26 RX ORDER — MORPHINE SULFATE 50 MG/1
2 CAPSULE, EXTENDED RELEASE ORAL
Qty: 0 | Refills: 0 | Status: DISCONTINUED | OUTPATIENT
Start: 2019-04-26 | End: 2019-04-26

## 2019-04-26 RX ORDER — OXYCODONE AND ACETAMINOPHEN 5; 325 MG/1; MG/1
1 TABLET ORAL ONCE
Qty: 0 | Refills: 0 | Status: DISCONTINUED | OUTPATIENT
Start: 2019-04-26 | End: 2019-04-26

## 2019-04-26 RX ORDER — ONDANSETRON 8 MG/1
4 TABLET, FILM COATED ORAL ONCE
Qty: 0 | Refills: 0 | Status: DISCONTINUED | OUTPATIENT
Start: 2019-04-26 | End: 2019-05-11

## 2019-04-26 RX ADMIN — SODIUM CHLORIDE 100 MILLILITER(S): 9 INJECTION, SOLUTION INTRAVENOUS at 09:40

## 2019-04-26 NOTE — CHART NOTE - NSCHARTNOTEFT_GEN_A_CORE
PACU ANESTHESIA ADMISSION NOTE      Procedure: Hysteroscopy, with dilation and curettage    Post op diagnosis:  Postmenopausal bleeding      ____  Intubated  TV:______       Rate: ______      FiO2: ______    __x__  Patent Airway    ____  Full return of protective reflexes    ____  Full recovery from anesthesia / back to baseline status    Vitals:  T(F):97.6  HR: 81  BP: 142/70  RR: 15  SpO2: 96%    Mental Status:  __x__ Awake   __x___ Alert   _____ Drowsy   _____ Sedated    Nausea/Vomiting:  _x___ NO  ______Yes,   See Post - Op Orders          Pain Scale (0-10):  _____    Treatment: ____ None    __x__ See Post - Op/PCA Orders    Post - Operative Fluids:   ____ Oral   _x___ See Post - Op Orders    Plan: Discharge:  x ____Home       _____Floor     _____Critical Care    _____  Other:_________________    Comments: Patient tolerated procedure  No aesthesia complications  Transfer to PACU

## 2019-04-26 NOTE — ASU DISCHARGE PLAN (ADULT/PEDIATRIC) - ASU DC SPECIAL INSTRUCTIONSFT
nothing in the vagina for 2 weeks, no tampons, no douching, no baths, no sex. Showers are fine.   Go to the emergency room if you have a temperature greater than 100.4, worsening abdominal pain or increased vaginal bleeding    See Dr. Richardson in 2 weeks

## 2019-04-26 NOTE — BRIEF OPERATIVE NOTE - OPERATION/FINDINGS
exam under anesthesia revealed an anteverted uterus, adnexa limited by body habitus. Hysteroscopy revealed an atrophic endometrium.

## 2019-04-26 NOTE — ASU DISCHARGE PLAN (ADULT/PEDIATRIC) - CARE PROVIDER_API CALL
Areli Richardson)  Obstetrics and Gynecology  [address] [place] [address]  Phone: [phone]  Fax: [phone]  Follow Up Time:

## 2019-04-29 LAB — SURGICAL PATHOLOGY STUDY: SIGNIFICANT CHANGE UP

## 2019-05-03 DIAGNOSIS — N95.0 POSTMENOPAUSAL BLEEDING: ICD-10-CM

## 2019-05-03 DIAGNOSIS — N84.0 POLYP OF CORPUS UTERI: ICD-10-CM

## 2019-05-30 ENCOUNTER — LABORATORY RESULT (OUTPATIENT)
Age: 59
End: 2019-05-30

## 2019-05-30 ENCOUNTER — APPOINTMENT (OUTPATIENT)
Dept: HEMATOLOGY ONCOLOGY | Facility: CLINIC | Age: 59
End: 2019-05-30

## 2019-05-30 VITALS
HEIGHT: 62 IN | WEIGHT: 284 LBS | BODY MASS INDEX: 52.26 KG/M2 | DIASTOLIC BLOOD PRESSURE: 101 MMHG | HEART RATE: 89 BPM | SYSTOLIC BLOOD PRESSURE: 142 MMHG | TEMPERATURE: 97.1 F

## 2019-05-30 PROBLEM — M19.90 UNSPECIFIED OSTEOARTHRITIS, UNSPECIFIED SITE: Chronic | Status: ACTIVE | Noted: 2019-04-16

## 2019-06-07 LAB
ANA SER IF-ACNC: NEGATIVE
FOLATE SERPL-MCNC: 17.2 NG/ML
HAPTOGLOB SERPL-MCNC: <20 MG/DL
HBV CORE IGG+IGM SER QL: NONREACTIVE
HBV SURFACE AB SER QL: NONREACTIVE
HBV SURFACE AG SER QL: NONREACTIVE
HCT VFR BLD CALC: 41.8 %
HCV AB SER QL: NONREACTIVE
HCV S/CO RATIO: 0.11 S/CO
HGB BLD-MCNC: 14.9 G/DL
LDH SERPL-CCNC: 406
MCHC RBC-ENTMCNC: 30.4 PG
MCHC RBC-ENTMCNC: 35.6 G/DL
MCV RBC AUTO: 85.3 FL
PLATELET # BLD AUTO: 72 K/UL
PMV BLD: 11.7 FL
RBC # BLD: 4.9 M/UL
RBC # FLD: 13.4 %
RETICS # AUTO: 2.8 %
RETICS AGGREG/RBC NFR: 139.2 K/UL
RHEUMATOID FACT SER QL: <10 IU/ML
VIT B12 SERPL-MCNC: 1579 PG/ML
WBC # FLD AUTO: 5.84 K/UL

## 2019-06-07 NOTE — REVIEW OF SYSTEMS
[Negative] : Neurological [de-identified] : Lower leg erythematous spots on right leg, and right sude if face, over the right eye

## 2019-06-07 NOTE — HISTORY OF PRESENT ILLNESS
[de-identified] : 58-year-old female with a history of lobular carcinoma in situ of the right breast and focal atypical ductal hyperplasia of the left breast status post bilateral lumpectomy.  She has been on tamoxifen since 12/2013 and tolerated well.  She is menopausal.  On 08/02/2016, she had a bilateral breast MRI with and without contrast at [facility].  There was no abnormal finding.  On 01/08/2016, she had a bilateral screening mammogram.  There was no abnormal finding.\par  [de-identified] : The patient had a screening mammogram on 3/8/2018 where calcifications were identified within the 8:00 axis of the right breast at posterior one third depth. \par \par She was subsequently underwent a diagnostic mammogram performed on the same day. Spot magnification views demonstrated a 0.5 cm of grouped heterogeneous \par calcifications within the right breast at 8:00, posterior one third depth for which a stereotactic biopsy was recommended. \par \par A stereotactic biopsy performed on 3/15/2018 demonstrated the pathology results of LCIS. At the time of the biopsy, a buckle shaped biopsy marking clip was deployed. Post procedural mammogram is suboptimal in the CC projection due to positioning, making it difficult to determine if the clip is in appropriate position relative to the target site. Repeat CC \par recommended prior to needle localization. \par \par Additionally, upon evaluation of prior mammogram dated 3/8/2018, there are grouped coarse heterogeneous calcifications spanning 0.3 cm within the upper \par outer quadrant of the right breast at middle one third depth for which stereotactic biopsy is recommended for further evaluation \par Recommendations/Contemplated Plan of Action: \par 1. Indeterminate right breast upper outer quadrant calcifications for which \par stereotactic biopsy is recommended. \par 2. A mammographically guided wire localization of the buckle shaped clip \par may be performed on the day of surgery, pending repeat CC view to determine \par if the clip is in appropriate position relative to the target site. \par \par On 5/21/18, she had right breast lumpectomy. HISTOLOGY: FINAL DIAGNOSIS \par 1. BREAST, RIGHT ANTERIOR MASS, NEEDLE LOCALIZED LUMPECTOMY: \par - FOCAL LOBULAR CARCINOMA IN-SITU (LCIS), CLASSICAL TYPE AND ATYPICAL \par LOBULAR HYPERPLASIA (ALH); ASSOCIATED WITH RARE MICROCALCIFICATIONS. \par - ATROPHIC FATTY BREAST TISSUE WITH MILDLY PROLIFERATIVE TYPE FIBROCYTIC \par CHANGES, FOCAL MILD VASCULAR MEDIAL CALCIFIC SCLEROSIS (MONCKEBERG'S), AND A \par HEALING PRIOR BIOPSY SITE ASSOCIATED WITH ORGANIZING HEMATOMA FORMATION. \par \par 2. BREAST, RIGHT POSTERIOR MASS, NEEDLE LOCALIZED LUMPECTOMY: \par - SINGLE MICROSCOPIC FOCUS OF ATYPICAL DUCT HYPERPLASIA (ADH), SOLID TYPE, \par 0.8 MM, LOCATED 2.0 MM FROM THE NEAREST INFERIOR INKED SURGICAL MARGIN \par (MICROSCOPIC MEASUREMENTS). \par - FOCAL LOBULAR CARCINOMA IN-SITU (LCIS), CLASSICAL TYPE AND ATYPICAL \par LOBULAR HYPERPLASIA (ALH). \par - ATROPHIC FATTY BREAST TISSUE WITH PROLIFERATIVE TYPE \par FIBROCYSTIC CHANGES, FOCAL MAMMARY DUCT ECTASIA, AND A HEALING PRIOR BIOPSY \par SITE. \par \par 1/10/19:\par The patient is here for f/u visit. She is taking tamoxifen for LCIS since 12/2013. She had right breast lumpectomy x 2 in 5/2018 which showed LCIS, classical type and ALH. She complains b/l leg pain more when she is walking and better when her legs were elevated. She had endometrial biopsy and D&C in 6 2018. There was no evidence of malignancy.\par \par 5/30/19\par Patient is here today for follow up visit.  She started tamoxifen for LCIS in 12/2013 x 3 years, then restarted it again in 5/2018 after right breast lumpectomy which showed LCIS, classical type and ALH. She offers no new breast complaints.  However, she had another D&C done on 4/26/19 for thickened endometrial lining and possible polyp.  Low platelets were noted on pre-op, plt= 88 4/10/19, repeated plt=78  on 5/3/19.  She has been taking aspirin for 20 years.  No new meds were added.

## 2019-06-07 NOTE — ASSESSMENT
[FreeTextEntry1] : ASSESSMENT: \par -- New onset thrombocytopenia.\par -- LCIS right breast, s/p lumpectomy in 2013 and 2018\par -- ADH left breast\par -- BCC skin LE\par \par PLAN: \par -- The patient has been on Tamoxifen for 5 years. However, she had recurrent LCIS in 2018. We discussed the options to switch to Anastrozole or Raloxifene. She wants to continue on Tamoxifen.\par -- Continue b/l breast imaging surveillance.\par -- Repeat CBC. Will review blood smear. Additional blood work for B12, folate, retic count, Haptoglobin, LDH, RADHA, RF and hep B and c screen.\par -- Hold ASA.\par -- RTO for followup in 1 month. Check platelets.\par \par Case was seen and discussed with Dr. Yusuf who agreed with the assessment and plan.\par

## 2019-06-07 NOTE — PHYSICAL EXAM
[Restricted in physically strenuous activity but ambulatory and able to carry out work of a light or sedentary nature] : Status 1- Restricted in physically strenuous activity but ambulatory and able to carry out work of a light or sedentary nature, e.g., light house work, office work [Normal] : normoactive bowel sounds, soft and nontender, no hepatosplenomegaly or masses appreciated [de-identified] : Right breast lumpectomy scar, There is no palpable mass or skin lesion

## 2019-06-13 ENCOUNTER — APPOINTMENT (OUTPATIENT)
Dept: BREAST CENTER | Facility: CLINIC | Age: 59
End: 2019-06-13
Payer: COMMERCIAL

## 2019-06-20 ENCOUNTER — APPOINTMENT (OUTPATIENT)
Dept: HEMATOLOGY ONCOLOGY | Facility: CLINIC | Age: 59
End: 2019-06-20

## 2019-06-20 ENCOUNTER — APPOINTMENT (OUTPATIENT)
Dept: BREAST CENTER | Facility: CLINIC | Age: 59
End: 2019-06-20
Payer: COMMERCIAL

## 2019-06-20 VITALS
HEIGHT: 62 IN | SYSTOLIC BLOOD PRESSURE: 138 MMHG | DIASTOLIC BLOOD PRESSURE: 86 MMHG | WEIGHT: 284 LBS | BODY MASS INDEX: 52.26 KG/M2 | TEMPERATURE: 98.9 F

## 2019-06-20 PROCEDURE — 99212 OFFICE O/P EST SF 10 MIN: CPT

## 2019-06-20 NOTE — PHYSICAL EXAM
[No Supraclavicular Adenopathy] : no supraclavicular adenopathy [Symmetrical] : symmetrical [Examined in the supine and seated position] : examined in the supine and seated position [No dominant masses] : no dominant masses in right breast  [No dominant masses] : no dominant masses left breast [No Nipple Retraction] : no left nipple retraction [Breast Mass Right Breast ___cm] : no masses [No Nipple Discharge] : no left nipple discharge [Breast Mass Left Breast ___cm] : no masses [No Axillary Lymphadenopathy] : no left axillary lymphadenopathy [No Swelling] : no swelling [No Edema] : no edema [Full ROM] : full range of motion [Breast Nipple Retraction] : nipples not retracted [Breast Nipple Inversion] : nipples not inverted [Breast Nipple Flattening] : nipples not flattened [Breast Abnormal Secretion Bloody Fluid] : no bloody discharge [Breast Abnormal Lactation (Galactorrhea)] : no galactorrhea [Breast Nipple Fissures] : nipples not fissured [Breast Abnormal Secretion Serous Fluid] : no serous discharge [Breast Abnormal Secretion Opalescent Fluid] : no milky discharge [de-identified] : Bilateral IMF fungal infections, right side greater than the left side.  No ulceration.

## 2019-06-20 NOTE — HISTORY OF PRESENT ILLNESS
[FreeTextEntry1] : Patient with Right breast classical type LCIS on stereotactic core biopsy 9/9/13; 10:00 N10, 12 mm.\par Left breast hyalinized fibroadenoma, ALH on stereotactic core biopsy 9/9/13; 7:00 N7, 2 mm.\par Left breast fibroadenoma with calcifications on stereotactic core biopsy 9/12/13; Lateral.\par Right breast intraductal papilloma on ultrasound guided core biopsy 10/16/13; 9:00 N1.  \par Right adipose breast tissue showing nodular stromal sclerosis on ultrasound guided core biopsy 10/16/13; 1:00 N2.  \par Bilateral axillary FNA 10/16/13 demonstrating lymphocytes and macrophages.\par Left breast cystic changes with focal duct hyperplasia usual type, sclerosing adenosis on MR guided core biopsy 10/23/13; 12:00.  \par \par Her family history is significant for her maternal aunt with breast cancer at 70; and her father with colon cancer.  \par Her Pepper Model risk assessment is:\par 3.7 % in five years \par 20.9 % in her lifetime\par She refuses high risk screening with bilateral breast MRI due to the inability to lie prone on the MRI table.  \par \par Status post Right NLOC of two areas, Left NLOC 12/2/13 demonstrating Right 9:00 LCIS classical type, ALH, radial sclerosing lesion; Right 10:00 LCIS classical and fibrocystic changes; Left focal ADH, micropapillary and columnar cell involving a radial scar.\par \par Right breast LCIS classical type on stereotactic biopsy core 3/15/18; 8:00 (buckle).\par Status post Right NLOC of two areas 05/21/18 demonstrating anterior mass focal LCIS classical type and LCIS and posterior mass with ADH, classical type LCIS and ALH.

## 2019-06-20 NOTE — DATA REVIEWED
[FreeTextEntry1] : Study Date:   01 May 2019 11:21 \par Referring Phys.:  ANGELINE RODRIGUEZ Performing Location:   [facility]  \par EXAM:  MAMMO TOMOSYNTHESIS SCREENING BILATERAL \par IMPRESSION:\par There is no mammographic evidence of malignancy.\par A 1 year screening mammogram of both breast(s) is recommended. The patient will be sent a normal letter.\par BI-RADS 1: NEGATIVE\par MAMMO TOMOSYNTHESIS SCREENING BILATERAL\par Clinical Breast Exam: Patient does report clinical breast exam in the last year.\par Clinical Indication: Routine screening.\par Compared to: 11/26/2018, 03/08/2018, 02/27/2017, 01/08/2016, 12/23/2014, 06/16/2014, and 06/16/2014\par Tomosynthesis and 2D imaging of the breast(s) were performed. Current study was also evaluated with a computer aided detection (CAD) system.\par Breast density: There are scattered areas of fibroglandular density.\par No significant masses, calcifications, or other findings are seen in either breast.\par Electronic Signature: I personally reviewed the images and agree with this report. Final Report: Dictated by and Signed by Attending Sofi Albrecht MD 5/1/2019 12:19 PM

## 2019-06-20 NOTE — PAST MEDICAL HISTORY
[History of Hormone Replacement Treatment] : has no history of hormone replacement treatment [FreeTextEntry5] : none [FreeTextEntry7] : OCP for a few months, and discontinued 1980's. [FreeTextEntry6] : none [FreeTextEntry8] : none

## 2019-06-20 NOTE — ASSESSMENT
[FreeTextEntry1] : 58 year old female with history of right breast classical type LCIS, right breast intraductal papilloma and left breast ALH, status post lumpectomy 2013 each demonstrating the same.  She also has a history of right breast classical type LCIS on stereotactic core biopsy, status post lumpectomy 2018 demonstrating ADH, LCIS classical type and ALH.  She is high risk for breast cancer with a lifetime Pepper Model of 21%.  Her family history is significant for a maternal aunt with breast cancer at 70, and her father with colon cancer.  \par \par A bilateral screening mammogram was performed in May.  This demonstrated scattered areas of fibroglandular densities.  There are no significant masses, calcifications or other findings seen in either breast.  There is no mammographic evidence of malignancy.\par \par She is here for evaluation of these findings.  At this time, these findings do not present the need for surgical intervention.  She has a benign clinical breast examination and no current complaints related to the breasts. For now she will need her annual screening mammogram scheduled for May 2020, with subsequent follow up clinical breast examination.  As stated previously, she cannot participate in MRI screening due to her inability to lie prone.  I spent a total of 10 minutes of face to face time with this patient, greater than 50% of which was spent in counseling and/or coordination of care.  All of her questions were appropriately answered.\par

## 2019-07-26 LAB
ALBUMIN SERPL ELPH-MCNC: 3.7 G/DL
ALP BLD-CCNC: 67 U/L
ALT SERPL-CCNC: 22 U/L
ANION GAP SERPL CALC-SCNC: 10 MMOL/L
AST SERPL-CCNC: 26 U/L
BILIRUB DIRECT SERPL-MCNC: 0.3 MG/DL
BILIRUB INDIRECT SERPL-MCNC: 0.7 MG/DL
BILIRUB SERPL-MCNC: 1 MG/DL
BUN SERPL-MCNC: 7 MG/DL
CALCIUM SERPL-MCNC: 9.1 MG/DL
CHLORIDE SERPL-SCNC: 98 MMOL/L
CO2 SERPL-SCNC: 26 MMOL/L
CREAT SERPL-MCNC: <0.5 MG/DL
GLUCOSE SERPL-MCNC: 101 MG/DL
POTASSIUM SERPL-SCNC: 3.7 MMOL/L
PROT SERPL-MCNC: 6.3 G/DL
SODIUM SERPL-SCNC: 134 MMOL/L

## 2019-08-21 ENCOUNTER — APPOINTMENT (OUTPATIENT)
Dept: HEMATOLOGY ONCOLOGY | Facility: CLINIC | Age: 59
End: 2019-08-21
Payer: COMMERCIAL

## 2019-08-21 ENCOUNTER — OUTPATIENT (OUTPATIENT)
Dept: OUTPATIENT SERVICES | Facility: HOSPITAL | Age: 59
LOS: 1 days | Discharge: HOME | End: 2019-08-21

## 2019-08-21 ENCOUNTER — LABORATORY RESULT (OUTPATIENT)
Age: 59
End: 2019-08-21

## 2019-08-21 VITALS
RESPIRATION RATE: 14 BRPM | HEIGHT: 62 IN | WEIGHT: 280 LBS | SYSTOLIC BLOOD PRESSURE: 145 MMHG | DIASTOLIC BLOOD PRESSURE: 80 MMHG | HEART RATE: 86 BPM | BODY MASS INDEX: 51.53 KG/M2

## 2019-08-21 DIAGNOSIS — D69.6 THROMBOCYTOPENIA, UNSPECIFIED: ICD-10-CM

## 2019-08-21 DIAGNOSIS — Z98.890 OTHER SPECIFIED POSTPROCEDURAL STATES: Chronic | ICD-10-CM

## 2019-08-21 DIAGNOSIS — D05.01 LOBULAR CARCINOMA IN SITU OF RIGHT BREAST: ICD-10-CM

## 2019-08-21 DIAGNOSIS — Z79.810 LONG TERM (CURRENT) USE OF SELECTIVE ESTROGEN RECEPTOR MODULATORS (SERMS): ICD-10-CM

## 2019-08-21 DIAGNOSIS — Z79.810 ENCOUNTER FOR THERAPEUTIC DRUG LVL MONITORING: ICD-10-CM

## 2019-08-21 DIAGNOSIS — Z51.81 ENCOUNTER FOR THERAPEUTIC DRUG LVL MONITORING: ICD-10-CM

## 2019-08-21 PROCEDURE — 99214 OFFICE O/P EST MOD 30 MIN: CPT

## 2019-09-02 PROBLEM — Z51.81 ENCOUNTER FOR MONITORING TAMOXIFEN THERAPY: Status: ACTIVE | Noted: 2019-09-02

## 2019-09-02 NOTE — ASSESSMENT
[FreeTextEntry1] : ASSESSMENT: \par -- New onset thrombocytopenia.\par -- Hepatosplenomegaly.\par -- LCIS right breast, s/p lumpectomy in 2013 and 2018\par -- ADH left breast\par -- BCC skin LE\par \par PLAN: \par -- Reviewed MRI abdomen report. It shows hepatosplenomegaly. Etiology is unclear. She is going to see Dr. Leal from gastroenterology to find out if there is GI issue to cause hepatosplenomegaly.\par -- Primary hematologic disorder such as lymphoproliferative disorder needs to be ruled out. We discuss BM biopsy. She want to see GI first. Will keep her on observation for now. \par -- The patient has been on Tamoxifen for 5 years. However, she had recurrent LCIS in 2018. We discussed the options to switch to Anastrozole or Raloxifene. She wants to continue on Tamoxifen.\par -- Continue b/l breast imaging surveillance. \par -- Ordered blood work: CBC, retic count, Haptoglobin, LDH, [place] direct and indirect.\par -- Follow up with [facility], Dr. Leal 9/16/19.\par -- RTO for followup and repeat CBC in 2 months. \par \par Case was seen and discussed with Dr. Yusuf who agreed with the assessment and plan.\par

## 2019-09-02 NOTE — CONSULT LETTER
[Dear  ___] : Dear  [unfilled], [Courtesy Letter:] : I had the pleasure of seeing your patient, [unfilled], in my office today. [Sincerely,] : Sincerely, [Please see my note below.] : Please see my note below. [FreeTextEntry3] : Breana Yusuf MD

## 2019-09-02 NOTE — HISTORY OF PRESENT ILLNESS
[de-identified] : 58-year-old female with a history of lobular carcinoma in situ of the right breast and focal atypical ductal hyperplasia of the left breast status post bilateral lumpectomy.  She has been on tamoxifen since 12/2013 and tolerated well.  She is menopausal.  On 08/02/2016, she had a bilateral breast MRI with and without contrast at [facility].  There was no abnormal finding.  On 01/08/2016, she had a bilateral screening mammogram.  There was no abnormal finding.\par  [de-identified] : The patient had a screening mammogram on 3/8/2018 where calcifications were identified within the 8:00 axis of the right breast at posterior one third depth. \par \par She was subsequently underwent a diagnostic mammogram performed on the same day. Spot magnification views demonstrated a 0.5 cm of grouped heterogeneous \par calcifications within the right breast at 8:00, posterior one third depth for which a stereotactic biopsy was recommended. \par \par A stereotactic biopsy performed on 3/15/2018 demonstrated the pathology results of LCIS. At the time of the biopsy, a buckle shaped biopsy marking clip was deployed. Post procedural mammogram is suboptimal in the CC projection due to positioning, making it difficult to determine if the clip is in appropriate position relative to the target site. Repeat CC \par recommended prior to needle localization. \par \par Additionally, upon evaluation of prior mammogram dated 3/8/2018, there are grouped coarse heterogeneous calcifications spanning 0.3 cm within the upper \par outer quadrant of the right breast at middle one third depth for which stereotactic biopsy is recommended for further evaluation \par Recommendations/Contemplated Plan of Action: \par 1. Indeterminate right breast upper outer quadrant calcifications for which \par stereotactic biopsy is recommended. \par 2. A mammographically guided wire localization of the buckle shaped clip \par may be performed on the day of surgery, pending repeat CC view to determine \par if the clip is in appropriate position relative to the target site. \par \par On 5/21/18, she had right breast lumpectomy. HISTOLOGY: FINAL DIAGNOSIS \par 1. BREAST, RIGHT ANTERIOR MASS, NEEDLE LOCALIZED LUMPECTOMY: \par - FOCAL LOBULAR CARCINOMA IN-SITU (LCIS), CLASSICAL TYPE AND ATYPICAL \par LOBULAR HYPERPLASIA (ALH); ASSOCIATED WITH RARE MICROCALCIFICATIONS. \par - ATROPHIC FATTY BREAST TISSUE WITH MILDLY PROLIFERATIVE TYPE FIBROCYTIC \par CHANGES, FOCAL MILD VASCULAR MEDIAL CALCIFIC SCLEROSIS (MONCKEBERG'S), AND A \par HEALING PRIOR BIOPSY SITE ASSOCIATED WITH ORGANIZING HEMATOMA FORMATION. \par \par 2. BREAST, RIGHT POSTERIOR MASS, NEEDLE LOCALIZED LUMPECTOMY: \par - SINGLE MICROSCOPIC FOCUS OF ATYPICAL DUCT HYPERPLASIA (ADH), SOLID TYPE, \par 0.8 MM, LOCATED 2.0 MM FROM THE NEAREST INFERIOR INKED SURGICAL MARGIN \par (MICROSCOPIC MEASUREMENTS). \par - FOCAL LOBULAR CARCINOMA IN-SITU (LCIS), CLASSICAL TYPE AND ATYPICAL \par LOBULAR HYPERPLASIA (ALH). \par - ATROPHIC FATTY BREAST TISSUE WITH PROLIFERATIVE TYPE \par FIBROCYSTIC CHANGES, FOCAL MAMMARY DUCT ECTASIA, AND A HEALING PRIOR BIOPSY \par SITE. \par \par 1/10/19:\par The patient is here for f/u visit. She is taking tamoxifen for LCIS since 12/2013. She had right breast lumpectomy x 2 in 5/2018 which showed LCIS, classical type and ALH. She complains b/l leg pain more when she is walking and better when her legs were elevated. She had endometrial biopsy and D&C in 6 2018. There was no evidence of malignancy.\par \par 5/30/19\par Patient is here today for follow up visit.  She started tamoxifen for LCIS in 12/2013 x 3 years, then restarted it again in 5/2018 after right breast lumpectomy which showed LCIS, classical type and ALH. She offers no new breast complaints.  However, she had another D&C done on 4/26/19 for thickened endometrial lining and possible polyp.  Low platelets were noted on pre-op, plt= 88 4/10/19, repeated plt=78  on 5/3/19.  She has been taking aspirin for 20 years.  No new meds were added.\par \par 8/21/19\par Patient is here today for follow up visit, She was found to have new onset thrombocytopenia, 70-80K. She discontinued her aspirin since last visit 3 months ago. Labs reviewed. Elevated RST=517, Haptoglobin <20. She had abdominal ultrasound on 6/10/19 which showed hepatomegaly and splenomegaly followed by MRI of abdomen on 8/2/19 which showed hepatomegaly 20.3 cm with diffuse fat disposition and cirrhosis.  No focal lesion. Splenomegaly 18.2 cm without focal lesions but splenic varices present, some of which drain to the left renal vein.  Cholelithiasis with multiple small gallstones. No abdominal ascites.  No abdominal adenopathy. She has her next appt with GI, Dr. Leal on 9/16/19.\par She was started tamoxifen for LCIS in 12/2013 x 3 years, then restarted it again in 5/2018 after right breast lumpectomy which showed LCIS, classical type and ALH. Last mammogram was done 5/1/19 which showed no evidence of malignancy.

## 2019-09-02 NOTE — PHYSICAL EXAM
[Restricted in physically strenuous activity but ambulatory and able to carry out work of a light or sedentary nature] : Status 1- Restricted in physically strenuous activity but ambulatory and able to carry out work of a light or sedentary nature, e.g., light house work, office work [Normal] : RRR, normal S1S2, no murmurs, rubs, gallops [de-identified] : Right breast lumpectomy scar, There is no palpable mass or skin lesion [de-identified] : liver edge palpated

## 2019-09-02 NOTE — REVIEW OF SYSTEMS
[Negative] : Neurological [de-identified] : Lower leg erythematous spots on right leg, and right sude if face, over the right eye

## 2019-09-02 NOTE — END OF VISIT
[FreeTextEntry3] : The patient was seen and examined by me. Lab and imaging results were reviewed. Recommendation was discussed with the patient. I reviewed and edited the progress note, and agree with above.

## 2019-09-03 NOTE — PRE-ANESTHESIA EVALUATION ADULT - SPO2 (%)
Office visit note: Family medicine      Chief complaint    Andrés Ferguson is a 62year old female who presents for   Chief Complaint   Patient presents with   â¢ Establish Care     new patient    â¢ Imm/Inj     flu shot         HPI  New patient who previously saw Dr John [facility] presents to establish care. 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 Only started going to the doctor in the last 1-2 years. Her main issue is leg pain L>R that started about 2 years ago. She has been going to Neurologist Dr Jessica Thomas who thinks she may have spinal stenosis. Pain used to force her to lean over when she tried to walk. She has been going to physical and aquatherapy, which has helped. She has been having a lot of pain in her left knee, though, and can't use it to get up off the ground due to pain. She falls asleep whenever she sits down but isn't sure if she has narcolepsy or she's just tired because it's so hard to sleep due to leg pain. Her mother also has chronic pain and goes to [facility]. Sometimes Andrés Keen will take 1/2-1 tab of her 10 mg Norco if pain is severe but she would rather have her own prescription. Her son had a severe opiate problem and she is very aware of the potential for dependence. She has not gone to pain management herself because she feels they push narcotics. She is very worried about skin lesions that have popped up.      PROBLEM LIST  Patient Active Problem List   Diagnosis   â¢ Chronic pain of left knee   â¢ Menopause   â¢ Pain in both lower extremities   â¢ Acquired hypothyroidism   â¢ Mixed obsessional thoughts and acts   â¢ Excessive daytime sleepiness       medical history    Past Medical History:   Diagnosis Date   â¢ ADD (attention deficit disorder)    â¢ Allergy    â¢ Anemia    â¢ Anxiety    â¢ Arthritis    â¢ Back pain    â¢ Bipolar disorder (CMS/HCC)    â¢ Cataract    â¢ Depressive disorder    â¢ FH: memory loss    â¢ Frequent headaches    â¢ Hepatitis     Probably hepatitis A (got it from food)   â¢ Herpes genitalia    â¢ History of seizure    â¢ Hypothyroidism    â¢ Jaundice     With hepatitis   â¢ Lumbar disc disease    â¢ Other chronic pain    â¢ Shoulder separation, left, subsequent encounter     With subsequent adhesive capsulitis   â¢ Substance abuse (CMS/HCC)     cocaine and methamphetamine     Health Maintenance   Topic Date Due   â¢ Colorectal Cancer Screening-Colonoscopy  2007   â¢ Hepatitis C Screening  2008   â¢ Breast Cancer Screening  2012   â¢ DTaP/Tdap/Td Vaccine (1 - Tdap) 2019 (Originally 1978)   â¢ Cervical Cancer Screening HPV CO-Testing  2019 (Originally 1987)   â¢ Shingles Vaccine (1 of 2) 2019 (Originally 2007)   â¢ Depression Screening  2020   â¢ Influenza Vaccine  Completed   â¢ Meningococcal Vaccine  Aged Out   â¢ Pneumococcal Vaccine 0-64  Aged Out     Patient Care Team:  Gladys Stevenson MD as PCP - General (Family Practice)  Destin Presley MD as Consulting Physician (Neurology)    SURGICAL history    Past Surgical History:   Procedure Laterality Date   â¢  section, classic     â¢  section, low transverse     â¢ Gynecologic cryosurgery     â¢ Sinus surgery         social history    Social History     Tobacco Use   â¢ Smoking status: Never Smoker   â¢ Smokeless tobacco: Never Used   Substance Use Topics   â¢ Alcohol use: No     Frequency: Never     Binge frequency: Never   â¢ Drug use: Not Currently     Types: Cocaine, Methamphetamines     Comment: When she was young       mEDICATIONS    Current Outpatient Medications   Medication Sig   â¢ gabapentin (NEURONTIN) 100 MG capsule Take 100 mg by mouth 3 times daily. â¢ levothyroxine (SYNTHROID, LEVOTHROID) 125 MCG tablet Take 125 mcg by mouth daily.    â¢ rOPINIRole (REQUIP) 0.25 MG tablet 1 TAB AT 9PM AND ANOTHER TAB(S) ORALLY ONCE A DAY (AT "BEDTIME) FOR LEG ACHES     No current facility-administered medications for this visit. aLLERGIES    ALLERGIES:   Allergen Reactions   â¢ Toradol ANXIETY     anxiety   â¢ Bactrim HIVES     hives       Review of systems    Review of Systems   Constitutional: Positive for activity change and fatigue. HENT: Positive for ear pain, rhinorrhea, sinus pressure and tinnitus. Respiratory: Positive for shortness of breath. Gastrointestinal: Positive for diarrhea and nausea. Genitourinary: Positive for flank pain and pelvic pain. Musculoskeletal: Positive for arthralgias, back pain, gait problem, joint swelling, myalgias, neck pain and neck stiffness. Neurological: Positive for light-headedness and numbness. Psychiatric/Behavioral: Positive for decreased concentration, dysphoric mood and sleep disturbance. All other systems reviewed and are negative. Physical Exam    Visit Vitals  /62 (BP Location: [facility], Patient Position: Sitting, Cuff Size: Regular)   Pulse 77   Temp 98.4 Â°F (36.9 Â°C) (Tympanic)   Resp 15   Ht 5' 0.75"" (1.543 m)   Wt 58.5 kg   SpO2 95%   BMI 24.57 kg/mÂ²      General:  Well developed, well nourished. In no apparent distress. Appears older than her stated age. Eyes:  Extraocular movements intact. Sclerae anicteric. HENT:  Normocephalic, atraumatic. She is edentulous. Respiratory:  Normal respiratory effort. Lungs clear to auscultation bilaterally. Symmetrical chest expansion. Cardiovascular:  Regular rate and rhythm. No murmurs, rubs, or gallops. No peripheral edema. MSK: No swelling of the L knee. There is significant crepitus with flexion and extension as well as tenderness with palpation of the pes anserine bursa. Skin: There are numerous, small, waxy, brown stuck on lesions on her legs and torso. Psychiatric: Cooperative. Appropriate mood and affect. Normal judgment.         Assessment & Plan    Chronic pain of left knee  - HYDROcodone-acetaminophen " (NORCO)  MG per tablet; Take 1 tablet by mouth daily as needed for Pain. Dispense: 30 tablet; Refill: 0  - XR KNEE 1 OR 2 VW LEFT; Future  - diclofenac (VOLTAREN) 1 % gel; Apply 4 g topically 4 times daily. Apply to the knees  Dispense: 300 g; Refill: 2  Ordered XR, prescribed Voltaren gel, and gave her 30 tabs of Norco to use when leg/knee pain is severe. She states she will use 1/2-1 tab only as needed and 30 tabs will probably last her 2 months. Will carefully monitor use. Will refer to Ortho if needed. Will need to have her sign a release form for Dr Thao Rodrigez at next visit. Seborrheic keratoses  Reassured her these are benign and encouraged her not to pick at them. Menopause  - BD DEXA AXIAL SKELETON; Future    Need for influenza vaccination  - INFLUENZA QUADRIVALENT SPLIT PRES FREE 0.5 ML VACC, IM (FLULAVAL,FLUARIX,FLUZONE)    Need for tetanus booster  We were out of Tdap vaccine today but will give at a future visit. Annual physical exam  - CBC WITH DIFFERENTIAL; Future  - COMPREHENSIVE METABOLIC PANEL; Future    Lipid screening  - LIPID PANEL WITH REFLEX; Future    Vitamin D deficiency  - VITAMIN D -25 HYDROXY; Future    Encounter for hepatitis C screening test for low risk patient  - HEPATITIS C ANTIBODY WITH REFLEX; Future    Acquired hypothyroidism  - THYROID STIMULATING HORMONE; Future    Antibody response examination  - HEPATITIS B SURFACE ANTIBODY, QUANTITATIVE; Future  - HEPATITIS A IGG AND IGM SCREEN; Future    History of hepatitis  - HEPATITIS B SURFACE ANTIGEN; Future      Return for Annual Exam, Pap smear. Trey Mendoza MD  [address], [address] [facility]  Phone: [phone]  Fax: [phone]    There are no Patient Instructions on file for this visit. 95

## 2019-09-06 DIAGNOSIS — N60.92 UNSPECIFIED BENIGN MAMMARY DYSPLASIA OF LEFT BREAST: ICD-10-CM

## 2019-09-06 DIAGNOSIS — R16.2 HEPATOMEGALY WITH SPLENOMEGALY, NOT ELSEWHERE CLASSIFIED: ICD-10-CM

## 2019-09-06 DIAGNOSIS — Z51.81 ENCOUNTER FOR THERAPEUTIC DRUG LEVEL MONITORING: ICD-10-CM

## 2019-09-26 ENCOUNTER — OUTPATIENT (OUTPATIENT)
Dept: OUTPATIENT SERVICES | Facility: HOSPITAL | Age: 59
LOS: 1 days | Discharge: HOME | End: 2019-09-26
Payer: COMMERCIAL

## 2019-09-26 VITALS
HEART RATE: 75 BPM | TEMPERATURE: 97 F | HEIGHT: 62 IN | WEIGHT: 268.74 LBS | SYSTOLIC BLOOD PRESSURE: 140 MMHG | OXYGEN SATURATION: 97 % | RESPIRATION RATE: 17 BRPM | DIASTOLIC BLOOD PRESSURE: 85 MMHG

## 2019-09-26 DIAGNOSIS — Z01.818 ENCOUNTER FOR OTHER PREPROCEDURAL EXAMINATION: ICD-10-CM

## 2019-09-26 DIAGNOSIS — Z98.890 OTHER SPECIFIED POSTPROCEDURAL STATES: Chronic | ICD-10-CM

## 2019-09-26 DIAGNOSIS — R16.2 HEPATOMEGALY WITH SPLENOMEGALY, NOT ELSEWHERE CLASSIFIED: ICD-10-CM

## 2019-09-26 LAB
ALBUMIN SERPL ELPH-MCNC: 3.9 G/DL — SIGNIFICANT CHANGE UP (ref 3.5–5.2)
ALP SERPL-CCNC: 67 U/L — SIGNIFICANT CHANGE UP (ref 30–115)
ALT FLD-CCNC: 20 U/L — SIGNIFICANT CHANGE UP (ref 0–41)
ANION GAP SERPL CALC-SCNC: 12 MMOL/L — SIGNIFICANT CHANGE UP (ref 7–14)
APPEARANCE UR: ABNORMAL
APTT BLD: 36.8 SEC — SIGNIFICANT CHANGE UP (ref 27–39.2)
AST SERPL-CCNC: 28 U/L — SIGNIFICANT CHANGE UP (ref 0–41)
BACTERIA # UR AUTO: ABNORMAL
BASOPHILS # BLD AUTO: 0.04 K/UL — SIGNIFICANT CHANGE UP (ref 0–0.2)
BASOPHILS NFR BLD AUTO: 0.9 % — SIGNIFICANT CHANGE UP (ref 0–1)
BILIRUB SERPL-MCNC: 0.8 MG/DL — SIGNIFICANT CHANGE UP (ref 0.2–1.2)
BILIRUB UR-MCNC: NEGATIVE — SIGNIFICANT CHANGE UP
BUN SERPL-MCNC: 7 MG/DL — LOW (ref 10–20)
CALCIUM SERPL-MCNC: 9.2 MG/DL — SIGNIFICANT CHANGE UP (ref 8.5–10.1)
CHLORIDE SERPL-SCNC: 99 MMOL/L — SIGNIFICANT CHANGE UP (ref 98–110)
CO2 SERPL-SCNC: 26 MMOL/L — SIGNIFICANT CHANGE UP (ref 17–32)
COLOR SPEC: YELLOW — SIGNIFICANT CHANGE UP
CREAT SERPL-MCNC: <0.5 MG/DL — LOW (ref 0.7–1.5)
DIFF PNL FLD: NEGATIVE — SIGNIFICANT CHANGE UP
EOSINOPHIL # BLD AUTO: 0.18 K/UL — SIGNIFICANT CHANGE UP (ref 0–0.7)
EOSINOPHIL NFR BLD AUTO: 3.9 % — SIGNIFICANT CHANGE UP (ref 0–8)
EPI CELLS # UR: 4 /HPF — SIGNIFICANT CHANGE UP (ref 0–5)
ESTIMATED AVERAGE GLUCOSE: 128 MG/DL — HIGH (ref 68–114)
GLUCOSE SERPL-MCNC: 170 MG/DL — HIGH (ref 70–99)
GLUCOSE UR QL: NEGATIVE — SIGNIFICANT CHANGE UP
HBA1C BLD-MCNC: 6.1 % — HIGH (ref 4–5.6)
HCT VFR BLD CALC: 39.7 % — SIGNIFICANT CHANGE UP (ref 37–47)
HGB BLD-MCNC: 14.1 G/DL — SIGNIFICANT CHANGE UP (ref 12–16)
HYALINE CASTS # UR AUTO: 4 /LPF — SIGNIFICANT CHANGE UP (ref 0–7)
IMM GRANULOCYTES NFR BLD AUTO: 0.4 % — HIGH (ref 0.1–0.3)
INR BLD: 1.4 RATIO — HIGH (ref 0.65–1.3)
KETONES UR-MCNC: SIGNIFICANT CHANGE UP
LEUKOCYTE ESTERASE UR-ACNC: ABNORMAL
LYMPHOCYTES # BLD AUTO: 1.19 K/UL — LOW (ref 1.2–3.4)
LYMPHOCYTES # BLD AUTO: 25.7 % — SIGNIFICANT CHANGE UP (ref 20.5–51.1)
MCHC RBC-ENTMCNC: 29.8 PG — SIGNIFICANT CHANGE UP (ref 27–31)
MCHC RBC-ENTMCNC: 35.5 G/DL — SIGNIFICANT CHANGE UP (ref 32–37)
MCV RBC AUTO: 83.9 FL — SIGNIFICANT CHANGE UP (ref 81–99)
MONOCYTES # BLD AUTO: 0.76 K/UL — HIGH (ref 0.1–0.6)
MONOCYTES NFR BLD AUTO: 16.4 % — HIGH (ref 1.7–9.3)
NEUTROPHILS # BLD AUTO: 2.44 K/UL — SIGNIFICANT CHANGE UP (ref 1.4–6.5)
NEUTROPHILS NFR BLD AUTO: 52.7 % — SIGNIFICANT CHANGE UP (ref 42.2–75.2)
NITRITE UR-MCNC: POSITIVE
NRBC # BLD: 0 /100 WBCS — SIGNIFICANT CHANGE UP (ref 0–0)
PH UR: 7 — SIGNIFICANT CHANGE UP (ref 5–8)
PLATELET # BLD AUTO: 80 K/UL — LOW (ref 130–400)
POTASSIUM SERPL-MCNC: 3.4 MMOL/L — LOW (ref 3.5–5)
POTASSIUM SERPL-SCNC: 3.4 MMOL/L — LOW (ref 3.5–5)
PROT SERPL-MCNC: 6.4 G/DL — SIGNIFICANT CHANGE UP (ref 6–8)
PROT UR-MCNC: SIGNIFICANT CHANGE UP
PROTHROM AB SERPL-ACNC: 16.1 SEC — HIGH (ref 9.95–12.87)
RBC # BLD: 4.73 M/UL — SIGNIFICANT CHANGE UP (ref 4.2–5.4)
RBC # FLD: 13.3 % — SIGNIFICANT CHANGE UP (ref 11.5–14.5)
RBC CASTS # UR COMP ASSIST: 4 /HPF — SIGNIFICANT CHANGE UP (ref 0–4)
SODIUM SERPL-SCNC: 137 MMOL/L — SIGNIFICANT CHANGE UP (ref 135–146)
SP GR SPEC: 1.02 — SIGNIFICANT CHANGE UP (ref 1.01–1.02)
UROBILINOGEN FLD QL: ABNORMAL
WBC # BLD: 4.63 K/UL — LOW (ref 4.8–10.8)
WBC # FLD AUTO: 4.63 K/UL — LOW (ref 4.8–10.8)
WBC UR QL: 9 /HPF — HIGH (ref 0–5)

## 2019-09-26 PROCEDURE — 93010 ELECTROCARDIOGRAM REPORT: CPT

## 2019-09-26 RX ORDER — ASPIRIN/CALCIUM CARB/MAGNESIUM 324 MG
1 TABLET ORAL
Qty: 0 | Refills: 0 | DISCHARGE

## 2019-09-26 NOTE — H&P PST ADULT - HISTORY OF PRESENT ILLNESS
PATIENT DENIES CHEST PAIN, SHORTNESS OF BREATH, PALPITATIONS, COUGHING, FEVER, DYSURIA.  CAN WALK UP 1-2 FLIGHTS OF STEPS WITHOUT SOB.

## 2019-09-26 NOTE — H&P PST ADULT - ACTIVITY
Dr. Perea,    Ok to schedule injection? Patient will follow up with Dr. Butler.    Estelle    Order Information for Heri England [[id number]]   Ordering Encounter Report      Order #: [id number] Order Date: 6/30/2017   Order Time: 11:54 AM     Procedure: SERVICE TO [facility] [] Diagnosis: Lumbosacral spondylosis without myelopathy [M47.817 (ICD-10-CM)]  Spinal stenosis, lumbar region, with neurogenic claudication [M48.06 (ICD-10-CM)]  Lumbar stenosis [M48.06 (ICD-10-CM)]       Reason for Exam:     Priority: Routine Class: Internal AH   Auth Provider: NAVARRO BUTLER Enc Provider: NAVARRO BUTLER [[id number]]   Department: [facility] Ordering User: Navarro Butler MD   Referred To Provider: GIRMA PEREA Instruct:     Comment: Refer to Dr. Perea of the [facility] for Left L4-5 and left L5-S1 transforaminal selective nerve root/epidural injections.                Question Answer Comment   Service requested Injection        
Please schedule left L4-5 and left L5-S1 epidural injections.  
AMB

## 2019-09-26 NOTE — H&P PST ADULT - REASON FOR ADMISSION
60 Y/O FEMALE HERE FOR PRE-ADMISSION SURGICAL TESTING. PATIENT REPORTS SHE HAD ROUTINE BLOOD WORK DONE IN MAY WHICH SHOWED A LOW PLATELET COUNT IN THE 70'S. PATIENT REPORTS W/U REVEALED A CIRRHOSIS OF THE LIVER.   NOW FOR SCHEDULED LIVER BIOPSY.

## 2019-10-03 ENCOUNTER — OUTPATIENT (OUTPATIENT)
Dept: OUTPATIENT SERVICES | Facility: HOSPITAL | Age: 59
LOS: 1 days | Discharge: HOME | End: 2019-10-03
Payer: COMMERCIAL

## 2019-10-03 ENCOUNTER — RESULT REVIEW (OUTPATIENT)
Age: 59
End: 2019-10-03

## 2019-10-03 DIAGNOSIS — Z98.890 OTHER SPECIFIED POSTPROCEDURAL STATES: Chronic | ICD-10-CM

## 2019-10-03 PROCEDURE — 88307 TISSUE EXAM BY PATHOLOGIST: CPT | Mod: 26

## 2019-10-03 PROCEDURE — 88313 SPECIAL STAINS GROUP 2: CPT | Mod: 26

## 2019-10-03 PROCEDURE — 76937 US GUIDE VASCULAR ACCESS: CPT | Mod: 26

## 2019-10-03 PROCEDURE — 36011 PLACE CATHETER IN VEIN: CPT | Mod: RT

## 2019-10-03 PROCEDURE — 99152 MOD SED SAME PHYS/QHP 5/>YRS: CPT

## 2019-10-03 PROCEDURE — 75970 VASCULAR BIOPSY: CPT | Mod: 26

## 2019-10-03 PROCEDURE — 37200 TRANSCATHETER BIOPSY: CPT

## 2019-10-03 NOTE — PROGRESS NOTE ADULT - SUBJECTIVE AND OBJECTIVE BOX
Interventional Radiology Outpatient Documentation    PREOPERATIVE DAY OF PROCEDURE EVALUATION:     I have personally seen and examined this patient. I agree with the history and physical which I have reviewed and noted any changes below:     Plan is for transjugular liver biopsy  (Signed electronically Darlin Gonzalez MD) 10-03-19 @ 17:30     Procedure/ risks/ benefits/ goals/ alternatives were explained. All questions answered. Informed content obtained from patient. Consent placed in chart.     POSTOPERATIVE PROCEDURAL EVALUATION:     Procedure: transjugular liver biopsy    Pre-Op Diagnosis: elevated lfts     Post-Op Diagnosis: same     Attending: Lisa  Resident: None    Anesthesia (type):  [ ] General Anesthesia  [ x] Sedation  [ ] Spinal Anesthesia  [ x] Local/Regional    Contrast: 30 mL    Estimated Blood Loss: Minimal, < 5 cc    Condition:   [ ] Critical  [ ] Serious  [ ] Fair   [x ] Good    Findings/Follow up Plan of Care:    See full report in pacs    3 cores obtained    Specimens Removed: surgical pathology    Implants: None    Complications: None    Disposition: Recovery

## 2019-10-04 LAB — SURGICAL PATHOLOGY STUDY: SIGNIFICANT CHANGE UP

## 2019-10-11 DIAGNOSIS — Z79.84 LONG TERM (CURRENT) USE OF ORAL HYPOGLYCEMIC DRUGS: ICD-10-CM

## 2019-10-11 DIAGNOSIS — E11.9 TYPE 2 DIABETES MELLITUS WITHOUT COMPLICATIONS: ICD-10-CM

## 2019-10-11 DIAGNOSIS — K76.89 OTHER SPECIFIED DISEASES OF LIVER: ICD-10-CM

## 2019-10-11 DIAGNOSIS — C50.911 MALIGNANT NEOPLASM OF UNSPECIFIED SITE OF RIGHT FEMALE BREAST: ICD-10-CM

## 2019-10-11 DIAGNOSIS — E66.9 OBESITY, UNSPECIFIED: ICD-10-CM

## 2019-10-11 DIAGNOSIS — K74.69 OTHER CIRRHOSIS OF LIVER: ICD-10-CM

## 2019-10-11 DIAGNOSIS — Z79.810 LONG TERM (CURRENT) USE OF SELECTIVE ESTROGEN RECEPTOR MODULATORS (SERMS): ICD-10-CM

## 2019-10-11 DIAGNOSIS — F17.210 NICOTINE DEPENDENCE, CIGARETTES, UNCOMPLICATED: ICD-10-CM

## 2019-10-11 DIAGNOSIS — I10 ESSENTIAL (PRIMARY) HYPERTENSION: ICD-10-CM

## 2019-10-20 LAB
DIRECT COOMBS: NORMAL
HAPTOGLOB SERPL-MCNC: <20 MG/DL
HCT VFR BLD CALC: 39.4 %
HGB BLD-MCNC: 14.7 G/DL
LDH SERPL-CCNC: 192
MCHC RBC-ENTMCNC: 30.8 PG
MCHC RBC-ENTMCNC: 36.3 G/DL
MCV RBC AUTO: 82.4 FL
PLATELET # BLD AUTO: 74 K/UL
PMV BLD: 10.5 FL
RBC # BLD: 4.78 M/UL
RBC # FLD: 13.2 %
RETICS # AUTO: 2.6 %
RETICS AGGREG/RBC NFR: 126.1 K/UL
WBC # FLD AUTO: 5.52 K/UL

## 2019-10-25 ENCOUNTER — LABORATORY RESULT (OUTPATIENT)
Age: 59
End: 2019-10-25

## 2019-10-25 ENCOUNTER — APPOINTMENT (OUTPATIENT)
Dept: HEMATOLOGY ONCOLOGY | Facility: CLINIC | Age: 59
End: 2019-10-25
Payer: COMMERCIAL

## 2019-10-25 VITALS
DIASTOLIC BLOOD PRESSURE: 84 MMHG | HEART RATE: 73 BPM | WEIGHT: 262 LBS | SYSTOLIC BLOOD PRESSURE: 125 MMHG | BODY MASS INDEX: 48.21 KG/M2 | HEIGHT: 62 IN | TEMPERATURE: 97.3 F

## 2019-10-25 DIAGNOSIS — Z00.00 ENCOUNTER FOR GENERAL ADULT MEDICAL EXAMINATION W/OUT ABNORMAL FINDINGS: ICD-10-CM

## 2019-10-25 LAB
HCT VFR BLD CALC: 38.6 %
HGB BLD-MCNC: 14.2 G/DL
MCHC RBC-ENTMCNC: 30.5 PG
MCHC RBC-ENTMCNC: 36.8 G/DL
MCV RBC AUTO: 82.8 FL
PLATELET # BLD AUTO: 70 K/UL
PMV BLD: 10.6 FL
RBC # BLD: 4.66 M/UL
RBC # FLD: 13 %
WBC # FLD AUTO: 4.35 K/UL

## 2019-10-25 PROCEDURE — 99214 OFFICE O/P EST MOD 30 MIN: CPT

## 2019-10-29 LAB
ALBUMIN SERPL ELPH-MCNC: 3.9 G/DL
ALP BLD-CCNC: 64 U/L
ALT SERPL-CCNC: 22 U/L
ANION GAP SERPL CALC-SCNC: 15 MMOL/L
APTT BLD: 37 SEC
AST SERPL-CCNC: 34 U/L
BILIRUB SERPL-MCNC: 1.4 MG/DL
BUN SERPL-MCNC: 6 MG/DL
CALCIUM SERPL-MCNC: 8.8 MG/DL
CHLORIDE SERPL-SCNC: 97 MMOL/L
CO2 SERPL-SCNC: 24 MMOL/L
CREAT SERPL-MCNC: <0.5 MG/DL
GLUCOSE SERPL-MCNC: 81 MG/DL
INR PPP: 1.48 RATIO
POTASSIUM SERPL-SCNC: 3.6 MMOL/L
PROT SERPL-MCNC: 6.3 G/DL
PT BLD: 16.9 SEC
SODIUM SERPL-SCNC: 136 MMOL/L

## 2019-10-29 NOTE — ASSESSMENT
[FreeTextEntry1] : ASSESSMENT:  \par -- Hepatosplenomegaly and thrombocytopenia, likely due to non alcoholic steatohepatitis (GALEAS) and liver cirrhosis. \par -- LCIS right breast, s/p lumpectomy in 2013 and 2018\par -- ADH left breast\par -- BCC skin LE\par \par PLAN: \par -- Reviewed liver biopsy result. The findings are consistent with non-alcoholic steatohepatitis with moderate steatosis, mild activity (grade 1of 3), and cirrhosis (stage 4 of 4). Hepatosplenomegaly and thrombocytopenia is likely related to hepatic disorder. However, primary bone marrow disorder or other hematologic disorder such as lymphoproliferative disorder can not be excluded. We discussed about BM biopsy. Rosario was reluctant but eventually agreed. We will call IR to schedule CT guided BM biopsy with sedation.\par -- Repeat CBC showed stable thrombocytopenia. Will continue observation. \par -- The patient has been on Tamoxifen for 5 years. However, she had recurrent LCIS in 2018. We discussed the options to switch to Anastrozole or Raloxifene. She wants to continue on Tamoxifen.\par -- Continue b/l breast imaging surveillance. \par -- Follow up with GI, Dr. Leal.\par -- RTO for followup in one  month. \par \par Case was seen and discussed with Dr. Yusuf who agreed with the assessment and plan.\par

## 2019-10-29 NOTE — HISTORY OF PRESENT ILLNESS
[de-identified] : 58-year-old female with a history of lobular carcinoma in situ of the right breast and focal atypical ductal hyperplasia of the left breast status post bilateral lumpectomy.  She has been on tamoxifen since 12/2013 and tolerated well.  She is menopausal.  On 08/02/2016, she had a bilateral breast MRI with and without contrast at [facility].  There was no abnormal finding.  On 01/08/2016, she had a bilateral screening mammogram.  There was no abnormal finding.\par  [de-identified] : The patient had a screening mammogram on 3/8/2018 where calcifications were identified within the 8:00 axis of the right breast at posterior one third depth. \par \par She was subsequently underwent a diagnostic mammogram performed on the same day. Spot magnification views demonstrated a 0.5 cm of grouped heterogeneous \par calcifications within the right breast at 8:00, posterior one third depth for which a stereotactic biopsy was recommended. \par \par A stereotactic biopsy performed on 3/15/2018 demonstrated the pathology results of LCIS. At the time of the biopsy, a buckle shaped biopsy marking clip was deployed. Post procedural mammogram is suboptimal in the CC projection due to positioning, making it difficult to determine if the clip is in appropriate position relative to the target site. Repeat CC \par recommended prior to needle localization. \par \par Additionally, upon evaluation of prior mammogram dated 3/8/2018, there are grouped coarse heterogeneous calcifications spanning 0.3 cm within the upper \par outer quadrant of the right breast at middle one third depth for which stereotactic biopsy is recommended for further evaluation \par Recommendations/Contemplated Plan of Action: \par 1. Indeterminate right breast upper outer quadrant calcifications for which \par stereotactic biopsy is recommended. \par 2. A mammographically guided wire localization of the buckle shaped clip \par may be performed on the day of surgery, pending repeat CC view to determine \par if the clip is in appropriate position relative to the target site. \par \par On 5/21/18, she had right breast lumpectomy. HISTOLOGY: FINAL DIAGNOSIS \par 1. BREAST, RIGHT ANTERIOR MASS, NEEDLE LOCALIZED LUMPECTOMY: \par - FOCAL LOBULAR CARCINOMA IN-SITU (LCIS), CLASSICAL TYPE AND ATYPICAL \par LOBULAR HYPERPLASIA (ALH); ASSOCIATED WITH RARE MICROCALCIFICATIONS. \par - ATROPHIC FATTY BREAST TISSUE WITH MILDLY PROLIFERATIVE TYPE FIBROCYTIC \par CHANGES, FOCAL MILD VASCULAR MEDIAL CALCIFIC SCLEROSIS (MONCKEBERG'S), AND A \par HEALING PRIOR BIOPSY SITE ASSOCIATED WITH ORGANIZING HEMATOMA FORMATION. \par \par 2. BREAST, RIGHT POSTERIOR MASS, NEEDLE LOCALIZED LUMPECTOMY: \par - SINGLE MICROSCOPIC FOCUS OF ATYPICAL DUCT HYPERPLASIA (ADH), SOLID TYPE, \par 0.8 MM, LOCATED 2.0 MM FROM THE NEAREST INFERIOR INKED SURGICAL MARGIN \par (MICROSCOPIC MEASUREMENTS). \par - FOCAL LOBULAR CARCINOMA IN-SITU (LCIS), CLASSICAL TYPE AND ATYPICAL \par LOBULAR HYPERPLASIA (ALH). \par - ATROPHIC FATTY BREAST TISSUE WITH PROLIFERATIVE TYPE \par FIBROCYSTIC CHANGES, FOCAL MAMMARY DUCT ECTASIA, AND A HEALING PRIOR BIOPSY \par SITE. \par \par 1/10/19:\par The patient is here for f/u visit. She is taking tamoxifen for LCIS since 12/2013. She had right breast lumpectomy x 2 in 5/2018 which showed LCIS, classical type and ALH. She complains b/l leg pain more when she is walking and better when her legs were elevated. She had endometrial biopsy and D&C in 6 2018. There was no evidence of malignancy.\par \par 5/30/19\par Patient is here today for follow up visit.  She started tamoxifen for LCIS in 12/2013 x 3 years, then restarted it again in 5/2018 after right breast lumpectomy which showed LCIS, classical type and ALH. She offers no new breast complaints.  However, she had another D&C done on 4/26/19 for thickened endometrial lining and possible polyp.  Low platelets were noted on pre-op, plt= 88 4/10/19, repeated plt=78  on 5/3/19.  She has been taking aspirin for 20 years.  No new meds were added.\par \par 8/21/19\par Patient is here today for follow up visit, She was found to have new onset thrombocytopenia, 70-80K. She discontinued her aspirin since last visit 3 months ago. Labs reviewed. Elevated XIE=367, Haptoglobin <20. She had abdominal ultrasound on 6/10/19 which showed hepatomegaly and splenomegaly followed by MRI of abdomen on 8/2/19 which showed hepatomegaly 20.3 cm with diffuse fat disposition and cirrhosis.  No focal lesion. Splenomegaly 18.2 cm without focal lesions but splenic varices present, some of which drain to the left renal vein.  Cholelithiasis with multiple small gallstones. No abdominal ascites.  No abdominal adenopathy. She has her next appt with GI, Dr. Leal on 9/16/19.\par She was started tamoxifen for LCIS in 12/2013 x 3 years, then restarted it again in 5/2018 after right breast lumpectomy which showed LCIS, classical type and ALH. Last mammogram was done 5/1/19 which showed no evidence of malignancy.\par \par 10/25/19:\par Patient is here today for follow up visit, She was found to have new onset thrombocytopenia, 70-80K, elevated CCQ=951, Haptoglobin <20, Eloina negative. She had abdominal ultrasound on 6/10/19 which showed hepatomegaly and splenomegaly followed by MRI of abdomen on 8/2/19 which showed hepatomegaly 20.3 cm with diffuse fat disposition and cirrhosis.  No focal lesion. Splenomegaly 18.2 cm without focal lesions but splenic varices present.  No abdominal adenopathy. \par She had liver biopsy on 10/3/19 which showed Liver, needle biopsy specimen shows micronodular cirrhosis withmoderate macrovesicular steatosis and mild lobular lymphocytic infiltration. Ballooned hepatocytes are rarely observed. Beth-Denk hyalins are not seen. The cirrhotic nodules are  by broad areas of multiacinar fibrosis replacing extinct parenchyma.The fibrotic areas, confirmed by trichrome stain, have mildductular reaction an mild lymphocytic infiltration. Iron stain is negative.\par Findings are consistent with alcoholic and/or non-alcoholic steatohepatitis with moderate steatosis, mild activity (grade 1of 3), and cirrhosis (stage 4 of 4).\par \par She has a history of LCIS right breast, s/p lumpectomy in 2013 and 2018, ADH left breast. She was on tamoxifen for LCIS in 12/2013 x 3 years, then restarted it again in 5/2018 after right breast lumpectomy which showed LCIS, classical type and ALH. Last mammogram was done 5/1/19 which showed no evidence of malignancy.

## 2019-10-29 NOTE — REVIEW OF SYSTEMS
[Negative] : Neurological [de-identified] : Lower leg erythematous spots on right leg, and right sude if face, over the right eye

## 2019-10-29 NOTE — PHYSICAL EXAM
[Restricted in physically strenuous activity but ambulatory and able to carry out work of a light or sedentary nature] : Status 1- Restricted in physically strenuous activity but ambulatory and able to carry out work of a light or sedentary nature, e.g., light house work, office work [Normal] : normoactive bowel sounds, soft and nontender, no hepatosplenomegaly or masses appreciated [de-identified] : Right breast lumpectomy scar, There is no palpable mass or skin lesion [de-identified] : liver edge palpated

## 2019-10-29 NOTE — CONSULT LETTER
[Dear  ___] : Dear  [unfilled], [Courtesy Letter:] : I had the pleasure of seeing your patient, [unfilled], in my office today. [Please see my note below.] : Please see my note below. [Sincerely,] : Sincerely, [FreeTextEntry3] : Breana Yusuf MD [DrFemi  ___] : Dr. FLORES

## 2019-11-19 ENCOUNTER — FORM ENCOUNTER (OUTPATIENT)
Age: 59
End: 2019-11-19

## 2019-11-20 ENCOUNTER — RESULT REVIEW (OUTPATIENT)
Age: 59
End: 2019-11-20

## 2019-11-20 ENCOUNTER — OUTPATIENT (OUTPATIENT)
Dept: OUTPATIENT SERVICES | Facility: HOSPITAL | Age: 59
LOS: 1 days | Discharge: HOME | End: 2019-11-20
Payer: COMMERCIAL

## 2019-11-20 DIAGNOSIS — Z98.890 OTHER SPECIFIED POSTPROCEDURAL STATES: Chronic | ICD-10-CM

## 2019-11-20 LAB — GLUCOSE BLDC GLUCOMTR-MCNC: 101 MG/DL — HIGH (ref 70–99)

## 2019-11-20 PROCEDURE — 88311 DECALCIFY TISSUE: CPT | Mod: 26

## 2019-11-20 PROCEDURE — 77012 CT SCAN FOR NEEDLE BIOPSY: CPT | Mod: 26

## 2019-11-20 PROCEDURE — 88342 IMHCHEM/IMCYTCHM 1ST ANTB: CPT | Mod: 26,59

## 2019-11-20 PROCEDURE — 88305 TISSUE EXAM BY PATHOLOGIST: CPT | Mod: 26

## 2019-11-20 PROCEDURE — 88313 SPECIAL STAINS GROUP 2: CPT | Mod: 26

## 2019-11-20 PROCEDURE — 88341 IMHCHEM/IMCYTCHM EA ADD ANTB: CPT | Mod: 26

## 2019-11-20 PROCEDURE — 99152 MOD SED SAME PHYS/QHP 5/>YRS: CPT

## 2019-11-20 PROCEDURE — 20220 BONE BIOPSY TROCAR/NDL SUPFC: CPT | Mod: LT

## 2019-11-20 PROCEDURE — 88189 FLOWCYTOMETRY/READ 16 & >: CPT

## 2019-11-20 NOTE — PROGRESS NOTE ADULT - SUBJECTIVE AND OBJECTIVE BOX
Interventional Radiology Outpatient Documentation    PREOPERATIVE DAY OF PROCEDURE EVALUATION:     I have personally seen and examined this patient. I agree with the history and physical which I have reviewed and noted any changes below:     Plan is for     ct guided bone marrow biopsy with conscious sedation    Procedure/ risks including but not limited to bleeding, infection, injury to bone/benefits/ goals/ alternatives were explained. All questions answered. Informed content obtained from patient. Consent placed in chart.

## 2019-11-21 LAB — TM INTERPRETATION: SIGNIFICANT CHANGE UP

## 2019-11-26 LAB — HEMATOPATHOLOGY REPORT: SIGNIFICANT CHANGE UP

## 2019-11-27 DIAGNOSIS — D69.6 THROMBOCYTOPENIA, UNSPECIFIED: ICD-10-CM

## 2019-11-27 DIAGNOSIS — E66.9 OBESITY, UNSPECIFIED: ICD-10-CM

## 2019-11-27 DIAGNOSIS — Z79.84 LONG TERM (CURRENT) USE OF ORAL HYPOGLYCEMIC DRUGS: ICD-10-CM

## 2019-11-27 DIAGNOSIS — F17.210 NICOTINE DEPENDENCE, CIGARETTES, UNCOMPLICATED: ICD-10-CM

## 2019-11-27 DIAGNOSIS — M19.90 UNSPECIFIED OSTEOARTHRITIS, UNSPECIFIED SITE: ICD-10-CM

## 2019-11-27 DIAGNOSIS — I10 ESSENTIAL (PRIMARY) HYPERTENSION: ICD-10-CM

## 2019-11-27 DIAGNOSIS — E11.9 TYPE 2 DIABETES MELLITUS WITHOUT COMPLICATIONS: ICD-10-CM

## 2019-11-27 DIAGNOSIS — R16.1 SPLENOMEGALY, NOT ELSEWHERE CLASSIFIED: ICD-10-CM

## 2019-11-27 LAB
CHROM ANALY INTERPHASE BLD FISH-IMP: SIGNIFICANT CHANGE UP
DNA PLOIDY SPEC FC-IMP: SIGNIFICANT CHANGE UP
DNA PLOIDY SPEC FC-IMP: SIGNIFICANT CHANGE UP

## 2019-12-03 LAB — CHROM ANALY OVERALL INTERP SPEC-IMP: SIGNIFICANT CHANGE UP

## 2020-01-01 NOTE — ASU PATIENT PROFILE, ADULT - AS SC BRADEN SENSORY
LINDA HERNANDEZ; First Name: Ada      GA 31.4 weeks;     Age: 13 d;   PMA: 32  BW:  1685   MRN: [id number]    COURSE: , TTN, thermoregulation, nutritional support, hyperbilirubinemia of prematurity    INTERVAL EVENTS: Stable on RA, No B/D thru 3-8;  OC 3/6; partial nipple feeding    Weight (g): 1690, -6                             Intake (ml/kg/day): 160  Urine output (ml/kg/hr or frequency): x 8                             Stools (frequency): X 2  Other:     Growth:    HC (cm): 29.5 ()           [-]  Length (cm):  43; [place] weight %  ____ ; ADWG (g/day)  _____ .  *******************************************************    RESP: s/p TTN  Comfortable in RA 3/1,    ·	off BCPAP   CV: stable hemodynamics. Continue to monitor  ID: no sepsis risk factors.  Heme/bili: resolving Hyperbilirubinemia of prematurity.   ·	HX:  On and Off phototx... Current: DC phototherapy 3-4 am.  trending down thru 3-5, monitor clinically.   FENGI: Immature feeding challenges FeHM or NS (majority currently)  34 ml q3H (160 ml/128 kcal),  PO 91% thru 3-8  ·	HX:  dc IV 3-3 pm, dc'd TPN.   ·	HX:  Metabolic acidosis resolving as of 3-2.  was likely due to prematurity. D stick monitoring.   ·	Will encourage breast milk, mother declined donor milk  ·	Access: none  Neuro: At risk for IVH/PVL. Serial HUS(3/3) no IVH. ~ 30 days _______.   NDE PTD.   Ophtho: At risk for ROP. Screening at 4 weeks.  Thermal: Immature thermoregulation; sp incubator. Current:   OC since 3/6  Social: Follow with social work services. CPS case called in  and accepted.  Update mother _3- by TM  Plan: encourage PO feeds, SWS.  Labs/Images/Studies:  none LINDA HERNANDEZ; First Name: Ada      GA 31.4 weeks;     Age: 13 d;   PMA: 32  BW:  1685   MRN: [id number]    COURSE: , TTN, thermoregulation, nutritional support, hyperbilirubinemia of prematurity    INTERVAL EVENTS: Stable on RA, No B/D thru 3-8;  OC 3/6; partial nipple feeding    Weight (g): 1744, +54                             Intake (ml/kg/day): 156  Urine output (ml/kg/hr or frequency): x 8                             Stools (frequency): X 4  Other:     Growth:    HC (cm): 30.5 on 3-9            Length (cm):  45 on 3-9 [place] weight %  ____ ; ADWG (g/day)  _____ .  *******************************************************    RESP: s/p TTN  Comfortable in RA 3/1,    ·	off BCPAP   CV: stable hemodynamics. Continue to monitor  ID: no sepsis risk factors.  Heme/bili: resolving Hyperbilirubinemia of prematurity.   ·	HX:  On and Off phototx... Current: DC phototherapy 3-4 am.  trending down thru 3-5, monitor clinically.   FENGI: Immature feeding challenges FeHM or NS (majority currently)  34 ml q3H (160 ml/128 kcal),  PO 59% thru 3-  ·	HX:  dc IV 3-3 pm, dc'd TPN.   ·	HX:  Metabolic acidosis resolving as of 3-2.  was likely due to prematurity. D stick monitoring.   ·	Will encourage breast milk, mother declined donor milk  ·	Access: none  Neuro: At risk for IVH/PVL. Serial HUS(3/3) no IVH. ~ 30 days _______.   NDE PTD.   Ophtho: At risk for ROP. Screening at 4 weeks.  Thermal: Immature thermoregulation; sp incubator. Current:   OC since 3/6  Social: Follow with social work services. CPS case called in  and accepted.  Update mother _3- by TM  Plan: encourage PO feeds, SWS.  Labs/Images/Studies:  none  DC planning:  awaiting sustained mature feeding pattern, still partial gavage. (4) no impairment

## 2020-01-24 ENCOUNTER — OUTPATIENT (OUTPATIENT)
Dept: OUTPATIENT SERVICES | Facility: HOSPITAL | Age: 60
LOS: 1 days | Discharge: HOME | End: 2020-01-24

## 2020-01-24 ENCOUNTER — APPOINTMENT (OUTPATIENT)
Dept: HEMATOLOGY ONCOLOGY | Facility: CLINIC | Age: 60
End: 2020-01-24
Payer: COMMERCIAL

## 2020-01-24 ENCOUNTER — LABORATORY RESULT (OUTPATIENT)
Age: 60
End: 2020-01-24

## 2020-01-24 VITALS
HEIGHT: 62 IN | HEART RATE: 72 BPM | SYSTOLIC BLOOD PRESSURE: 141 MMHG | TEMPERATURE: 97.2 F | BODY MASS INDEX: 48.03 KG/M2 | WEIGHT: 261 LBS | DIASTOLIC BLOOD PRESSURE: 76 MMHG

## 2020-01-24 DIAGNOSIS — D05.01 LOBULAR CARCINOMA IN SITU OF RIGHT BREAST: ICD-10-CM

## 2020-01-24 DIAGNOSIS — R16.2 HEPATOMEGALY WITH SPLENOMEGALY, NOT ELSEWHERE CLASSIFIED: ICD-10-CM

## 2020-01-24 DIAGNOSIS — Z98.890 OTHER SPECIFIED POSTPROCEDURAL STATES: Chronic | ICD-10-CM

## 2020-01-24 DIAGNOSIS — D69.6 THROMBOCYTOPENIA, UNSPECIFIED: ICD-10-CM

## 2020-01-24 PROCEDURE — 99214 OFFICE O/P EST MOD 30 MIN: CPT

## 2020-01-25 NOTE — REVIEW OF SYSTEMS
[Negative] : Neurological [de-identified] : Lower leg erythematous spots on right leg, and right sude if face, over the right eye

## 2020-01-25 NOTE — ASSESSMENT
[FreeTextEntry1] : ASSESSMENT:  \par -- Hepatosplenomegaly and thrombocytopenia, likely due to non alcoholic steatohepatitis (GALEAS) and liver cirrhosis. \par -- Thrombocytopenia, likely due to liver cirrhosis. \par -- LCIS right breast, s/p lumpectomy in 2013 and 2018\par -- ADH left breast\par -- BCC skin LE\par \par PLAN: \par -- Reviewed BM biopsy results. It showed Hypocellular bone marrow (20%) with maturing trilineage hematopoiesis. Routine cytogenetics showed normal female karyotype and FISH panel for MDS was normal. Therefor, the etiology of thrombocytopenia is likely due to hepatic disorder and liver cirrhosis. \par -- Followup with gastroenterologist Dr. Carrillo for GALEAS. Encouraged healthy diet and weight reduction. \par -- Repeat CBC showed stable thrombocytopenia with PLT 78. WBC and Hb are normal. Will continue observation. \par -- Continue Raloxifene for breast cancer prevention. \par -- Continue b/l breast imaging surveillance. \par -- RTO for followup in three months. \par \par

## 2020-01-25 NOTE — HISTORY OF PRESENT ILLNESS
[de-identified] : The patient had a screening mammogram on 3/8/2018 where calcifications were identified within the 8:00 axis of the right breast at posterior one third depth. \par \par She was subsequently underwent a diagnostic mammogram performed on the same day. Spot magnification views demonstrated a 0.5 cm of grouped heterogeneous \par calcifications within the right breast at 8:00, posterior one third depth for which a stereotactic biopsy was recommended. \par \par A stereotactic biopsy performed on 3/15/2018 demonstrated the pathology results of LCIS. At the time of the biopsy, a buckle shaped biopsy marking clip was deployed. Post procedural mammogram is suboptimal in the CC projection due to positioning, making it difficult to determine if the clip is in appropriate position relative to the target site. Repeat CC \par recommended prior to needle localization. \par \par Additionally, upon evaluation of prior mammogram dated 3/8/2018, there are grouped coarse heterogeneous calcifications spanning 0.3 cm within the upper \par outer quadrant of the right breast at middle one third depth for which stereotactic biopsy is recommended for further evaluation \par Recommendations/Contemplated Plan of Action: \par 1. Indeterminate right breast upper outer quadrant calcifications for which \par stereotactic biopsy is recommended. \par 2. A mammographically guided wire localization of the buckle shaped clip \par may be performed on the day of surgery, pending repeat CC view to determine \par if the clip is in appropriate position relative to the target site. \par \par On 5/21/18, she had right breast lumpectomy. HISTOLOGY: FINAL DIAGNOSIS \par 1. BREAST, RIGHT ANTERIOR MASS, NEEDLE LOCALIZED LUMPECTOMY: \par - FOCAL LOBULAR CARCINOMA IN-SITU (LCIS), CLASSICAL TYPE AND ATYPICAL \par LOBULAR HYPERPLASIA (ALH); ASSOCIATED WITH RARE MICROCALCIFICATIONS. \par - ATROPHIC FATTY BREAST TISSUE WITH MILDLY PROLIFERATIVE TYPE FIBROCYTIC \par CHANGES, FOCAL MILD VASCULAR MEDIAL CALCIFIC SCLEROSIS (MONCKEBERG'S), AND A \par HEALING PRIOR BIOPSY SITE ASSOCIATED WITH ORGANIZING HEMATOMA FORMATION. \par \par 2. BREAST, RIGHT POSTERIOR MASS, NEEDLE LOCALIZED LUMPECTOMY: \par - SINGLE MICROSCOPIC FOCUS OF ATYPICAL DUCT HYPERPLASIA (ADH), SOLID TYPE, \par 0.8 MM, LOCATED 2.0 MM FROM THE NEAREST INFERIOR INKED SURGICAL MARGIN \par (MICROSCOPIC MEASUREMENTS). \par - FOCAL LOBULAR CARCINOMA IN-SITU (LCIS), CLASSICAL TYPE AND ATYPICAL \par LOBULAR HYPERPLASIA (ALH). \par - ATROPHIC FATTY BREAST TISSUE WITH PROLIFERATIVE TYPE \par FIBROCYSTIC CHANGES, FOCAL MAMMARY DUCT ECTASIA, AND A HEALING PRIOR BIOPSY \par SITE. \par \par 1/10/19:\par The patient is here for f/u visit. She is taking tamoxifen for LCIS since 12/2013. She had right breast lumpectomy x 2 in 5/2018 which showed LCIS, classical type and ALH. She complains b/l leg pain more when she is walking and better when her legs were elevated. She had endometrial biopsy and D&C in 6 2018. There was no evidence of malignancy.\par \par 5/30/19\par Patient is here today for follow up visit.  She started tamoxifen for LCIS in 12/2013 x 3 years, then restarted it again in 5/2018 after right breast lumpectomy which showed LCIS, classical type and ALH. She offers no new breast complaints.  However, she had another D&C done on 4/26/19 for thickened endometrial lining and possible polyp.  Low platelets were noted on pre-op, plt= 88 4/10/19, repeated plt=78  on 5/3/19.  She has been taking aspirin for 20 years.  No new meds were added.\par \par 8/21/19\par Patient is here today for follow up visit, She was found to have new onset thrombocytopenia, 70-80K. She discontinued her aspirin since last visit 3 months ago. Labs reviewed. Elevated AAZ=032, Haptoglobin <20. She had abdominal ultrasound on 6/10/19 which showed hepatomegaly and splenomegaly followed by MRI of abdomen on 8/2/19 which showed hepatomegaly 20.3 cm with diffuse fat disposition and cirrhosis.  No focal lesion. Splenomegaly 18.2 cm without focal lesions but splenic varices present, some of which drain to the left renal vein.  Cholelithiasis with multiple small gallstones. No abdominal ascites.  No abdominal adenopathy. She has her next appt with GI, Dr. Leal on 9/16/19.\par She was started tamoxifen for LCIS in 12/2013 x 3 years, then restarted it again in 5/2018 after right breast lumpectomy which showed LCIS, classical type and ALH. Last mammogram was done 5/1/19 which showed no evidence of malignancy.\par \par 10/25/19:\par Patient is here today for follow up visit, She was found to have new onset thrombocytopenia, 70-80K, elevated BIB=507, Haptoglobin <20, Eloina negative. She had abdominal ultrasound on 6/10/19 which showed hepatomegaly and splenomegaly followed by MRI of abdomen on 8/2/19 which showed hepatomegaly 20.3 cm with diffuse fat disposition and cirrhosis.  No focal lesion. Splenomegaly 18.2 cm without focal lesions but splenic varices present.  No abdominal adenopathy. \par She had liver biopsy on 10/3/19 which showed Liver, needle biopsy specimen shows micronodular cirrhosis withmoderate macrovesicular steatosis and mild lobular lymphocytic infiltration. Ballooned hepatocytes are rarely observed. Beth-Denk hyalins are not seen. The cirrhotic nodules are  by broad areas of multiacinar fibrosis replacing extinct parenchyma.The fibrotic areas, confirmed by trichrome stain, have mildductular reaction an mild lymphocytic infiltration. Iron stain is negative.\par Findings are consistent with alcoholic and/or non-alcoholic steatohepatitis with moderate steatosis, mild activity (grade 1of 3), and cirrhosis (stage 4 of 4).\par \par She has a history of LCIS right breast, s/p lumpectomy in 2013 and 2018, ADH left breast. She was on tamoxifen for LCIS in 12/2013 x 3 years, then restarted it again in 5/2018 after right breast lumpectomy which showed LCIS, classical type and ALH. Last mammogram was done 5/1/19 which showed no evidence of malignancy.\par \par 1/24/2020:\par Patient is here today for follow up visit, She was found to have thrombocytopenia, 70-80K, elevated LYX=827, Haptoglobin <20, Eloina negative. She had abdominal ultrasound on 6/10/19 which showed hepatomegaly and splenomegaly followed by MRI of abdomen on 8/2/19 which showed hepatomegaly 20.3 cm with diffuse fat disposition and cirrhosis.  No focal lesion. Splenomegaly 18.2 cm without focal lesions but splenic varices present.  No abdominal adenopathy. \par She had liver biopsy on 10/3/19 which showed micronodular cirrhosis with moderate macrovesicular steatosis and mild lobular lymphocytic infiltration. Ballooned hepatocytes are rarely observed. Beth-Denk hyalins are not seen. The cirrhotic nodules are  by broad areas of multiacinar fibrosis replacing extinct parenchyma.The fibrotic areas, confirmed by trichrome stain, have mildductular reaction an mild lymphocytic infiltration. Iron stain is negative.\par Findings are consistent with alcoholic and/or non-alcoholic steatohepatitis with moderate steatosis, mild activity (grade 1of 3), and cirrhosis (stage 4 of 4).\par \par To further evaluate etiology of thrombocytopenia, she had BM biopsy on 11/20/19 which showed Hypocellular bone marrow (20%) with maturing trilineage hematopoiesis. There was no dysplastic change and no increase blasts. FISH MDS panel and routine cytogenetics were normal. \par \par She has been followed by her gastroenterologist. She is on weight watch diet and has lost > 10 pounds. \par \par She has a history of LCIS right breast, s/p lumpectomy in 2013 and 2018, ADH left breast. She was on tamoxifen for LCIS in 12/2013 x 3 years, then restarted it again in 5/2018 after right breast lumpectomy which showed LCIS, classical type and ALH. Due to liver problems, she stopped tamoxifen and switched to Raloxifene. Last mammogram was done 5/1/19 which showed no evidence of malignancy.\par  [de-identified] : 58-year-old female with a history of lobular carcinoma in situ of the right breast and focal atypical ductal hyperplasia of the left breast status post bilateral lumpectomy.  She has been on tamoxifen since 12/2013 and tolerated well.  She is menopausal.  On 08/02/2016, she had a bilateral breast MRI with and without contrast at [facility].  There was no abnormal finding.  On 01/08/2016, she had a bilateral screening mammogram.  There was no abnormal finding.\par

## 2020-01-25 NOTE — PHYSICAL EXAM
[Restricted in physically strenuous activity but ambulatory and able to carry out work of a light or sedentary nature] : Status 1- Restricted in physically strenuous activity but ambulatory and able to carry out work of a light or sedentary nature, e.g., light house work, office work [Normal] : normoactive bowel sounds, soft and nontender, no hepatosplenomegaly or masses appreciated [de-identified] : Right breast lumpectomy scar, There is no palpable mass or skin lesion [de-identified] : liver edge palpated

## 2020-01-25 NOTE — CONSULT LETTER
[Dear  ___] : Dear  [unfilled], [Courtesy Letter:] : I had the pleasure of seeing your patient, [unfilled], in my office today. [Please see my note below.] : Please see my note below. [Sincerely,] : Sincerely, [DrFemi  ___] : Dr. FLORES [FreeTextEntry3] : Breana Yusuf MD

## 2020-02-03 DIAGNOSIS — Z51.81 ENCOUNTER FOR THERAPEUTIC DRUG LEVEL MONITORING: ICD-10-CM

## 2020-05-26 DIAGNOSIS — R92.8 OTHER ABNORMAL AND INCONCLUSIVE FINDINGS ON DIAGNOSTIC IMAGING OF BREAST: ICD-10-CM

## 2020-06-01 ENCOUNTER — NON-APPOINTMENT (OUTPATIENT)
Age: 60
End: 2020-06-01

## 2020-06-18 ENCOUNTER — APPOINTMENT (OUTPATIENT)
Dept: BREAST CENTER | Facility: CLINIC | Age: 60
End: 2020-06-18

## 2020-07-16 ENCOUNTER — APPOINTMENT (OUTPATIENT)
Dept: BREAST CENTER | Facility: CLINIC | Age: 60
End: 2020-07-16
Payer: COMMERCIAL

## 2020-07-16 VITALS
SYSTOLIC BLOOD PRESSURE: 136 MMHG | DIASTOLIC BLOOD PRESSURE: 80 MMHG | HEIGHT: 62 IN | TEMPERATURE: 98.6 F | WEIGHT: 261 LBS | BODY MASS INDEX: 48.03 KG/M2

## 2020-07-16 PROCEDURE — 99213 OFFICE O/P EST LOW 20 MIN: CPT

## 2020-07-16 RX ORDER — RALOXIFENE HYDROCHLORIDE 60 MG/1
60 TABLET ORAL
Refills: 0 | Status: ACTIVE | COMMUNITY

## 2020-07-16 RX ORDER — UBIDECARENONE/VIT E ACET 100MG-5
CAPSULE ORAL
Refills: 0 | Status: ACTIVE | COMMUNITY

## 2020-07-16 RX ORDER — TAMOXIFEN CITRATE 20 MG/1
20 TABLET, FILM COATED ORAL DAILY
Qty: 90 | Refills: 2 | Status: DISCONTINUED | COMMUNITY
Start: 2018-07-05 | End: 2020-07-16

## 2020-07-16 RX ORDER — OXYCODONE AND ACETAMINOPHEN 5; 325 MG/1; MG/1
5-325 TABLET ORAL
Qty: 15 | Refills: 0 | Status: DISCONTINUED | COMMUNITY
Start: 2018-05-21 | End: 2020-07-16

## 2020-07-16 RX ORDER — NYSTATIN AND TRIAMCINOLONE ACETONIDE 100000; 1 MG/G; MG/G
100000-0.1 CREAM TOPICAL TWICE DAILY
Qty: 60 | Refills: 5 | Status: DISCONTINUED | COMMUNITY
Start: 2018-12-06 | End: 2020-07-16

## 2020-07-16 NOTE — CONSULT LETTER
[Dear  ___] : Dear ~SOLO, [Please see my note below.] : Please see my note below. [Consult Letter:] : I had the pleasure of evaluating your patient, [unfilled]. [Sincerely,] : Sincerely, [Consult Closing:] : Thank you very much for allowing me to participate in the care of this patient.  If you have any questions, please do not hesitate to contact me. [FreeTextEntry2] : Eliazar Blood M.D.\par [address]\[place] [address] \par \par Areli Richardson M.D.\par [address], [address]\[place] [address]  [FreeTextEntry3] : Ann Jones M.D., F.A.C.S.

## 2020-07-16 NOTE — DATA REVIEWED
[FreeTextEntry1] : Study Date:   22 May 2020 08:29\par EXAM:  MAMMO TOMOSYNTHESIS SCREENING BILATERAL\par \par IMPRESSION:\par \par Indeterminate calcifications in the left breast. Additional magnification views, a lateral view are recommended.\par \par An additional imaging exam of the left breast(s) is recommended. The patient will be sent a letter to return for additional imaging.\par \par BI-RADS 0: INCOMPLETE - NEED ADDITIONAL IMAGING EVALUATION\par \par MAMMO TOMOSYNTHESIS SCREENING BILATERAL\par Clinical Breast Exam: Patient does report clinical breast exam in the last year.\par Clinical Indication: Routine screening. Family history of maternal aunt with breast cancer.\par Compared to: 05/01/2019, 11/26/2018, 03/08/2018, 02/27/2017, 01/08/2016, 12/23/2014, and 06/16/2014\par Tomosynthesis and 2D imaging of the breast(s) were performed. Current study was also evaluated with a computer aided detection (CAD) system.\par Breast density: There are scattered areas of fibroglandular density.\par No significant masses, calcifications, or other findings are seen in the right breast. In the left breast at 7:00 location there are grouped calcifications for which additional imaging is recommended.\par Electronic Signature: I personally reviewed the images and agree with this report. Final Report: Dictated by and Signed by Attending Sofi Albrecht MD 5/22/2020 9:34 AM\par \par Study Date:   22 May 2020 12:23\par EXAM:  MARCO ANTONIO UNILATERAL LEFT\par \par IMPRESSION:\par \par Indeterminate calcifications in the left breast. Stereotactic core biopsy is recommended for further evaluation.\par \par Biopsy of the left breast(s) is recommended. A letter will be sent to the patient to return for a biopsy.\par The findings and recommendations were discussed with the patient.\par BI-RADS 4: SUSPICIOUS\par \par \par Study Date:   27 May 2020 08:45\par EXAM:  Digital Spot Compression: [id number]\par \par Pathology Report Received From [facility]:\par The pathology report of the left breast stereotactic biopsy dated 5/27/2020 indicated that the target at 7:00 is HIGH RISK\par \par SPECIMEN NUMBER: [id number]\par COLLECTION DATE: 05/27/2020\par \par SPECIMENS:\par \par 1. BREAST, LEFT, 7 O'CLOCK, W/ CALCS: CORE BIOPSY\par 2. BREAST, LEFT, 7 O'CLOCK, TISSUE: CORE BIOPSY\par \par DIAGNOSIS:\par \par 1. BREAST, LEFT, 7 O'CLOCK, W/ CALCS, CORE BIOPSY:\par - CALCIFIED FIBROADENOMATOID CHANGES WITH FOCAL ATYPICAL DUCTAL\par HYPERPLASIA (ADH). See note.\par Note:\par Multiple deeper tissue levels and immunohistochemistry stain for\par E-Cadherin were examined.\par 2. BREAST, LEFT, 7 O'CLOCK, TISSUE, CORE BIOPSY:\par - BREAST TISSUE SHOWING USUAL DUCTAL HYPERPLASIA.\par Sofi Bailey MD\par Pathologist\par \par The pathology results are concordant with the imaging findings.\par \par The patient has been notified of these results by telephone by Dr. Ngo on 6/1/2020 at 8:20 AM. She has been advised to contact your office and to arrange for SURGICAL CONSULTATION to discuss management options.\par \par Surgical consultation of the left breast(s) is recommended. _\par \par Electronic Signature: I personally reviewed the images and agree with this report. Final Report: Dictated by and Signed by Attending Vi Ngo MD 6/1/2020 8:21 AM\par *******END OF ADDENDUM******\par \par IMPRESSION:\par Status post stereotactic core biopsy of the left breast with placement of a metallic clip. Pathology pending.\par A final report will be issued when Pathology results are available. _\par \par MAMMO STEREO CORE BREAST BIOPSY LEFT\par History and consultation: Review of the mammogram dated 5/22/2020 revealed a cluster of indeterminate microcalcifications in the left breast at 7 o'clock, which was recommended for biopsy.\par \par Benefits, risks, and alternatives to stereotactically guided biopsy of the breast were discussed with the patient and informed, written consent was obtained.\par \par The patient denied taking aspirin or anticoagulants for the past week and stated no allergies to topical antiseptic solution or local anesthetic.\par \par Utilizing universal protocol, site and patient verification was performed prior to starting the procedure.\par \par Procedure:\par The skin was prepped in the usual manner. Local anesthetic was administered to the access site. A skin nick was made in the breast. The abnormality was approached from a caudocranial approach using a prone table. A 9 gauge vacuum assisted biopsy device was utilized to obtain multiple specimens.\par \par Specimen radiograph: Radiograph of the specimens confirmed the presence of the targeted calcifications within the tissue samples.\par \par Subsequently, using standard technique, a buckle shaped metallic clip was placed at the biopsy site.\par \par POST PROCEDURE MAMMOGRAM FOR MARKER PLACEMENT\par A post-procedure mammogram was performed and demonstrates the clip placed is in the appropriate location.\par The patient tolerated the procedure well without immediate complication.\par Electronic Signature: I personally reviewed the images and agree with this report. Final Report: Dictated by and Signed by Attending Vi Ngo MD 5/27/2020 9:52 AM\par \par \par \par

## 2020-07-16 NOTE — PAST MEDICAL HISTORY
[Menarche Age ____] : age at menarche was [unfilled] [Menopause Age____] : age at menopause was [unfilled] [Total Preg ___] : G[unfilled] [Live Births ___] : P[unfilled]  [Age At Live Birth ___] : Age at live birth: [unfilled] [History of Hormone Replacement Treatment] : has no history of hormone replacement treatment [FreeTextEntry5] : none [FreeTextEntry6] : none [FreeTextEntry7] : OCP for a few months, and discontinued 1980's. [FreeTextEntry8] : none

## 2020-07-16 NOTE — PHYSICAL EXAM
[Normocephalic] : normocephalic [Atraumatic] : atraumatic [No Supraclavicular Adenopathy] : no supraclavicular adenopathy [No Cervical Adenopathy] : no cervical adenopathy [Supple] : supple [Examined in the supine and seated position] : examined in the supine and seated position [No Thyromegaly] : no thyromegaly [Symmetrical] : symmetrical [No dominant masses] : no dominant masses left breast [No dominant masses] : no dominant masses in right breast  [No Nipple Discharge] : no right nipple discharge [No Nipple Retraction] : no left nipple retraction [Breast Mass Left Breast ___cm] : no masses [Breast Mass Right Breast ___cm] : no masses [No Axillary Lymphadenopathy] : no left axillary lymphadenopathy [No Swelling] : no swelling [No Edema] : no edema [Full ROM] : full range of motion [Breast Nipple Inversion] : nipples not inverted [Breast Nipple Fissures] : nipples not fissured [Breast Nipple Retraction] : nipples not retracted [Breast Nipple Flattening] : nipples not flattened [Breast Abnormal Lactation (Galactorrhea)] : no galactorrhea [Breast Abnormal Secretion Serous Fluid] : no serous discharge [Breast Abnormal Secretion Bloody Fluid] : no bloody discharge [Breast Abnormal Secretion Opalescent Fluid] : no milky discharge

## 2020-07-16 NOTE — HISTORY OF PRESENT ILLNESS
[FreeTextEntry1] : PMD: Dr. Eliazar Blood\par GYN: Dr. Areli Richardson\par \par Patient with Right breast classical type LCIS on stereotactic core biopsy 9/9/13; 10:00 N10, 12 mm.\par Left breast hyalinized fibroadenoma, ALH on stereotactic core biopsy 9/9/13; 7:00 N7, 2 mm.\par Left breast fibroadenoma with calcifications on stereotactic core biopsy 9/12/13; Lateral.\par Right breast intraductal papilloma on ultrasound guided core biopsy 10/16/13; 9:00 N1.  \par Right adipose breast tissue showing nodular stromal sclerosis on ultrasound guided core biopsy 10/16/13; 1:00 N2.  \par Bilateral axillary FNA 10/16/13 demonstrating lymphocytes and macrophages.\par Left breast cystic changes with focal duct hyperplasia usual type, sclerosing adenosis on MR guided core biopsy 10/23/13; 12:00.  \par \par Her family history is significant for her maternal aunt with breast cancer at 70; and her father with colon cancer.  \par Her Pepper Model risk assessment is:\par 4.0 % in five years \par 17.1 % in her lifetime; recalculated 7/16/20.\par She refuses high risk screening with bilateral breast MRI due to the inability to lie prone on the MRI table.  \par \par Status post Right NLOC of two areas, Left NLOC 12/2/13 demonstrating Right 9:00 LCIS classical type, ALH, radial sclerosing lesion; Right 10:00 LCIS classical and fibrocystic changes; Left focal ADH, micropapillary and columnar cell involving a radial scar.\par \par Right breast LCIS classical type on stereotactic biopsy core 3/15/18; 8:00 (buckle).\par Status post Right NLOC of two areas 05/21/18 demonstrating anterior mass focal LCIS classical type and LCIS and posterior mass with ADH, classical type LCIS and ALH.  \par \par Patient with left breast calcified fibroadenomatoid changes with focal Atypical Ductal Hyperplasia (ADH) on stereotactic guided biopsy on 5/27/20; 7:00 (buckle) \par \par Was on Tamoxifen for five years with Dr. Ramos, switched to Raloxifene in January 2020.  \par

## 2020-07-16 NOTE — REVIEW OF SYSTEMS
[Fever] : no fever [Chills] : no chills [Breast Pain] : no breast pain [Breast Lump] : no breast lump [Breast Swelling] : no breast swelling [Breast Reddening] : no reddening of the breast [Breast Warmth] : no breast warmth [Enlargement] : no breast enlargement [Breast Itching] : no breast itching ['Orange Peel' Appearance] : no 'orange peel' appearance of breast skin [Dimpling Of Skin] : no dimpling of breast skin [Decreasing In Size] : breast size not decreasing [Nipple Inverted] : no inversion of the nipple [Nipple Discharge] : no nipple discharge [FreeTextEntry2] : patient denies recent travel history.

## 2020-07-16 NOTE — ASSESSMENT
[FreeTextEntry1] : 60 year old female who presents today for consultation.  She is status post left breast stereotactic core biopsy 5/27/20 demonstrating atypical ductal hyperplasia at 7:00.  She has a history of multiple breast biopsies including right breast classical type LCIS 9/9/13; left breast AL 9/9/13; right breast intraductal papilloma 10/16/13 status post right breast NLOC of two areas and left breast NLOC 12/2/13 each demonstrating the same.  She also has a history of right breast LCIS classical type on stereotactic core biopsy 3/15/18 status post lumpectomy 5/21/18 demonstrating the same.  She was on Tamoxifen for five years with Dr. Yusuf.  She discontinued Tamoxifen due to issues with her liver and was placed on raloxifene.  Her family history is significant for her maternal aunt with breast cancer at 70 and her father with colon cancer. Her Pepper Model risk assessment is 17.1% in her lifetime.  \par \par On physical exam, there are no discrete masses in either breast or axilla.  There is no nipple discharge or inversion bilaterally.  There are no skin changes bilaterally.  Her pathology results were reviewed.  We discussed atypical ductal hyperplasia. These are considered benign high-risk lesions. The recommendation is for surgical excision or lumpectomy. The reason for surgical excision is because there is about a 15-30% upgrade rate to either DCIS or invasive cancer. Additionally, the presence of ADH has been associated with an increased lifetime risk of breast cancer by about 4-fold.  Since this is not readily palpable, it will need to be localized preoperatively with a wire placement on the day of her surgery.  She agrees to lumpectomy with needle localization.  \par \par The benefits and risks of the lumpectomy procedure were explained to the patient, including but not limited to bleeding, infection, seroma and/or hematoma formation, pain, numbness of skin, scarring, and possible re-operation if the surgical excision demonstrates positive margins.  Informed consent was obtained today.  She is aware that she will meet with the pre-surgical department here at the hospital for pre-surgery testing.  She is also aware that she will likely need to meet with her primary care physician, and/or other medical specialists to obtain clearance for surgery, and to contact them appropriately.  \par \par I spent a total of 15 minutes of face to face time with this patient, greater than 50% of which was spent in counseling and/or coordination of care.  All of her questions were appropriately answered.

## 2020-07-24 ENCOUNTER — LABORATORY RESULT (OUTPATIENT)
Age: 60
End: 2020-07-24

## 2020-07-24 ENCOUNTER — APPOINTMENT (OUTPATIENT)
Dept: HEMATOLOGY ONCOLOGY | Facility: CLINIC | Age: 60
End: 2020-07-24
Payer: COMMERCIAL

## 2020-07-24 VITALS
HEART RATE: 61 BPM | TEMPERATURE: 96.8 F | DIASTOLIC BLOOD PRESSURE: 86 MMHG | HEIGHT: 62 IN | WEIGHT: 277 LBS | BODY MASS INDEX: 50.97 KG/M2 | SYSTOLIC BLOOD PRESSURE: 137 MMHG

## 2020-07-24 LAB
HCT VFR BLD CALC: 40.3 %
HCT VFR BLD CALC: 40.9 %
HGB BLD-MCNC: 14.4 G/DL
HGB BLD-MCNC: 14.7 G/DL
MCHC RBC-ENTMCNC: 29.7 PG
MCHC RBC-ENTMCNC: 30.3 PG
MCHC RBC-ENTMCNC: 35.7 G/DL
MCHC RBC-ENTMCNC: 35.9 G/DL
MCV RBC AUTO: 83.1 FL
MCV RBC AUTO: 84.3 FL
PLATELET # BLD AUTO: 76 K/UL
PLATELET # BLD AUTO: 78 K/UL
PMV BLD: 10.8 FL
PMV BLD: 11.7 FL
RBC # BLD: 4.85 M/UL
RBC # BLD: 4.85 M/UL
RBC # FLD: 13.2 %
RBC # FLD: 14 %
WBC # FLD AUTO: 5.02 K/UL
WBC # FLD AUTO: 5.08 K/UL

## 2020-07-24 PROCEDURE — 99214 OFFICE O/P EST MOD 30 MIN: CPT

## 2020-07-28 ENCOUNTER — LABORATORY RESULT (OUTPATIENT)
Age: 60
End: 2020-07-28

## 2020-07-30 ENCOUNTER — OUTPATIENT (OUTPATIENT)
Dept: OUTPATIENT SERVICES | Facility: HOSPITAL | Age: 60
LOS: 1 days | Discharge: HOME | End: 2020-07-30

## 2020-07-30 DIAGNOSIS — Z98.890 OTHER SPECIFIED POSTPROCEDURAL STATES: Chronic | ICD-10-CM

## 2020-07-30 DIAGNOSIS — Z02.9 ENCOUNTER FOR ADMINISTRATIVE EXAMINATIONS, UNSPECIFIED: ICD-10-CM

## 2020-08-05 ENCOUNTER — OUTPATIENT (OUTPATIENT)
Dept: OUTPATIENT SERVICES | Facility: HOSPITAL | Age: 60
LOS: 1 days | Discharge: HOME | End: 2020-08-05
Payer: COMMERCIAL

## 2020-08-05 ENCOUNTER — RESULT REVIEW (OUTPATIENT)
Age: 60
End: 2020-08-05

## 2020-08-05 VITALS
SYSTOLIC BLOOD PRESSURE: 168 MMHG | WEIGHT: 279.55 LBS | DIASTOLIC BLOOD PRESSURE: 77 MMHG | HEIGHT: 62 IN | HEART RATE: 95 BPM | OXYGEN SATURATION: 100 % | RESPIRATION RATE: 20 BRPM | TEMPERATURE: 97 F

## 2020-08-05 DIAGNOSIS — N60.92 UNSPECIFIED BENIGN MAMMARY DYSPLASIA OF LEFT BREAST: ICD-10-CM

## 2020-08-05 DIAGNOSIS — Z98.890 OTHER SPECIFIED POSTPROCEDURAL STATES: Chronic | ICD-10-CM

## 2020-08-05 DIAGNOSIS — Z01.818 ENCOUNTER FOR OTHER PREPROCEDURAL EXAMINATION: ICD-10-CM

## 2020-08-05 LAB
A1C WITH ESTIMATED AVERAGE GLUCOSE RESULT: 7.3 % — HIGH (ref 4–5.6)
ALBUMIN SERPL ELPH-MCNC: 3.5 G/DL — SIGNIFICANT CHANGE UP (ref 3.5–5.2)
ALP SERPL-CCNC: 91 U/L — SIGNIFICANT CHANGE UP (ref 30–115)
ALT FLD-CCNC: 23 U/L — SIGNIFICANT CHANGE UP (ref 0–41)
ANION GAP SERPL CALC-SCNC: 11 MMOL/L — SIGNIFICANT CHANGE UP (ref 7–14)
APTT BLD: 39.7 SEC — HIGH (ref 27–39.2)
AST SERPL-CCNC: 38 U/L — SIGNIFICANT CHANGE UP (ref 0–41)
BASOPHILS # BLD AUTO: 0.04 K/UL — SIGNIFICANT CHANGE UP (ref 0–0.2)
BASOPHILS NFR BLD AUTO: 1 % — SIGNIFICANT CHANGE UP (ref 0–1)
BILIRUB SERPL-MCNC: 1.6 MG/DL — HIGH (ref 0.2–1.2)
BUN SERPL-MCNC: 5 MG/DL — LOW (ref 10–20)
CALCIUM SERPL-MCNC: 8.3 MG/DL — LOW (ref 8.5–10.1)
CHLORIDE SERPL-SCNC: 94 MMOL/L — LOW (ref 98–110)
CO2 SERPL-SCNC: 27 MMOL/L — SIGNIFICANT CHANGE UP (ref 17–32)
CREAT SERPL-MCNC: <0.5 MG/DL — LOW (ref 0.7–1.5)
EOSINOPHIL # BLD AUTO: 0.15 K/UL — SIGNIFICANT CHANGE UP (ref 0–0.7)
EOSINOPHIL NFR BLD AUTO: 3.9 % — SIGNIFICANT CHANGE UP (ref 0–8)
ESTIMATED AVERAGE GLUCOSE: 163 MG/DL — HIGH (ref 68–114)
GLUCOSE SERPL-MCNC: 236 MG/DL — HIGH (ref 70–99)
HCT VFR BLD CALC: 40.6 % — SIGNIFICANT CHANGE UP (ref 37–47)
HGB BLD-MCNC: 14.4 G/DL — SIGNIFICANT CHANGE UP (ref 12–16)
IMM GRANULOCYTES NFR BLD AUTO: 0.3 % — SIGNIFICANT CHANGE UP (ref 0.1–0.3)
INR BLD: 1.55 RATIO — HIGH (ref 0.65–1.3)
LYMPHOCYTES # BLD AUTO: 0.92 K/UL — LOW (ref 1.2–3.4)
LYMPHOCYTES # BLD AUTO: 24.1 % — SIGNIFICANT CHANGE UP (ref 20.5–51.1)
MCHC RBC-ENTMCNC: 30.3 PG — SIGNIFICANT CHANGE UP (ref 27–31)
MCHC RBC-ENTMCNC: 35.5 G/DL — SIGNIFICANT CHANGE UP (ref 32–37)
MCV RBC AUTO: 85.5 FL — SIGNIFICANT CHANGE UP (ref 81–99)
MONOCYTES # BLD AUTO: 0.51 K/UL — SIGNIFICANT CHANGE UP (ref 0.1–0.6)
MONOCYTES NFR BLD AUTO: 13.4 % — HIGH (ref 1.7–9.3)
NEUTROPHILS # BLD AUTO: 2.18 K/UL — SIGNIFICANT CHANGE UP (ref 1.4–6.5)
NEUTROPHILS NFR BLD AUTO: 57.3 % — SIGNIFICANT CHANGE UP (ref 42.2–75.2)
NRBC # BLD: 0 /100 WBCS — SIGNIFICANT CHANGE UP (ref 0–0)
PLATELET # BLD AUTO: 70 K/UL — LOW (ref 130–400)
POTASSIUM SERPL-MCNC: 3.4 MMOL/L — LOW (ref 3.5–5)
POTASSIUM SERPL-SCNC: 3.4 MMOL/L — LOW (ref 3.5–5)
PROT SERPL-MCNC: 5.8 G/DL — LOW (ref 6–8)
PROTHROM AB SERPL-ACNC: 17.8 SEC — HIGH (ref 9.95–12.87)
RBC # BLD: 4.75 M/UL — SIGNIFICANT CHANGE UP (ref 4.2–5.4)
RBC # FLD: 13.9 % — SIGNIFICANT CHANGE UP (ref 11.5–14.5)
SODIUM SERPL-SCNC: 132 MMOL/L — LOW (ref 135–146)
WBC # BLD: 3.81 K/UL — LOW (ref 4.8–10.8)
WBC # FLD AUTO: 3.81 K/UL — LOW (ref 4.8–10.8)

## 2020-08-05 PROCEDURE — 71046 X-RAY EXAM CHEST 2 VIEWS: CPT | Mod: 26

## 2020-08-05 PROCEDURE — 93010 ELECTROCARDIOGRAM REPORT: CPT

## 2020-08-05 RX ORDER — PREGABALIN 225 MG/1
1 CAPSULE ORAL
Qty: 0 | Refills: 0 | DISCHARGE

## 2020-08-05 RX ORDER — ACETAMINOPHEN 500 MG
2 TABLET ORAL
Qty: 0 | Refills: 0 | DISCHARGE

## 2020-08-05 RX ORDER — CHOLECALCIFEROL (VITAMIN D3) 125 MCG
1 CAPSULE ORAL
Qty: 0 | Refills: 0 | DISCHARGE

## 2020-08-05 RX ORDER — METFORMIN HYDROCHLORIDE 850 MG/1
0 TABLET ORAL
Qty: 0 | Refills: 0 | DISCHARGE

## 2020-08-05 RX ORDER — UBIDECARENONE 100 MG
1 CAPSULE ORAL
Qty: 0 | Refills: 0 | DISCHARGE

## 2020-08-05 NOTE — H&P PST ADULT - HISTORY OF PRESENT ILLNESS
61 y/o female scheduled for left breast lumpectomy   reports no c/o cp,sob,palpitations,coughor dysuria  1-2 fos without sob    denies any exposure to COVID -19  advised to self quarantine until after surg      Anesthesia Alert  NO--Difficult Airway  NO--History of neck surgery or radiation  NO--Limited ROM of neck  NO--History of Malignant hyperthermia  NO--Personal or family history of Pseudocholinesterase deficiency  NO--Prior Anesthesia Complication  NO--Latex Allergy  NO--Loose teeth (partial dentures)  NO--History of Rheumatoid Arthritis  NO--OG  NO--Other_____

## 2020-08-05 NOTE — H&P PST ADULT - NSICDXPASTMEDICALHX_GEN_ALL_CORE_FT
PAST MEDICAL HISTORY:  Chronic lower back pain SCIATICA, PAIN RADIATING TO B/L LE    DM (diabetes mellitus)     HTN (hypertension)     Lobular carcinoma in situ (LCIS) of right breast     Nonalcoholic fatty liver disease without nonalcoholic steatohepatitis (GALEAS)     OA (osteoarthritis)     Obesity bmi 49    Smoker     Venous insufficiency ? CHR ULCER RT LE

## 2020-08-06 ENCOUNTER — OUTPATIENT (OUTPATIENT)
Dept: OUTPATIENT SERVICES | Facility: HOSPITAL | Age: 60
LOS: 1 days | Discharge: HOME | End: 2020-08-06

## 2020-08-06 DIAGNOSIS — Z98.890 OTHER SPECIFIED POSTPROCEDURAL STATES: Chronic | ICD-10-CM

## 2020-08-06 DIAGNOSIS — Z02.9 ENCOUNTER FOR ADMINISTRATIVE EXAMINATIONS, UNSPECIFIED: ICD-10-CM

## 2020-08-06 PROBLEM — K76.0 FATTY (CHANGE OF) LIVER, NOT ELSEWHERE CLASSIFIED: Chronic | Status: ACTIVE | Noted: 2020-08-05

## 2020-08-06 LAB — SODIUM SERPL-SCNC: 133 MMOL/L — LOW (ref 135–146)

## 2020-08-07 DIAGNOSIS — Z02.9 ENCOUNTER FOR ADMINISTRATIVE EXAMINATIONS, UNSPECIFIED: ICD-10-CM

## 2020-08-11 NOTE — PHYSICAL EXAM
[Restricted in physically strenuous activity but ambulatory and able to carry out work of a light or sedentary nature] : Status 1- Restricted in physically strenuous activity but ambulatory and able to carry out work of a light or sedentary nature, e.g., light house work, office work [Normal] : normoactive bowel sounds, soft and nontender, no hepatosplenomegaly or masses appreciated [de-identified] : Right breast lumpectomy scar, There is no palpable mass or skin lesion [de-identified] : liver edge palpated

## 2020-08-11 NOTE — REVIEW OF SYSTEMS
[Negative] : Neurological [de-identified] : Lower leg erythematous spots on right leg, and right sude if face, over the right eye

## 2020-08-11 NOTE — ASSESSMENT
[FreeTextEntry1] : ASSESSMENT:  \par -- Hepatosplenomegaly and thrombocytopenia, likely due to non alcoholic steatohepatitis (GALEAS) and liver cirrhosis. \par -- LCIS right breast, s/p lumpectomy in 2013 and 2018\par -- ADH left breast\par -- BCC skin LE\par \par PLAN: \par -- Stereotactic guided biopsy showed calcified fibroadnematoid changes with focal atypical ductal hyperplasia. This is benign high risk lesion and surgical excision is indicated. She will follow up with Dr. Jones. \par -- Continue Raloxifene daily for breast cancer prevention. \par -- Continue b/l breast imaging surveillance. \par -- Reviewed repeat CBC which shows stable thrombocytopenia with PLT 76K, WBC and Hb are normal. Continue observation.\par -- Followup with gastroenterologist Dr. Carrillo for GALEAS. Encouraged healthy diet and weight reduction.  \par -- RTO for followup in three months. \par \par Addendum:\par The patient is scheduled for left breast surgery. She has chronic thrombocytopenia. Pre-op blood work on 8/5 showed PLT 70K and mild prolonged  PT and PTT. She is at increased risk of bleeding. Recommend to have one unit single donor PLT and 2 units FFP on hold for OR. She may proceed with breast surgical excision or lumpectomy.  \par

## 2020-08-11 NOTE — HISTORY OF PRESENT ILLNESS
[de-identified] : 58-year-old female with a history of lobular carcinoma in situ of the right breast and focal atypical ductal hyperplasia of the left breast status post bilateral lumpectomy.  She has been on tamoxifen since 12/2013 and tolerated well.  She is menopausal.  On 08/02/2016, she had a bilateral breast MRI with and without contrast at [facility].  There was no abnormal finding.  On 01/08/2016, she had a bilateral screening mammogram.  There was no abnormal finding.\par  [de-identified] : The patient had a screening mammogram on 3/8/2018 where calcifications were identified within the 8:00 axis of the right breast at posterior one third depth. \par \par She was subsequently underwent a diagnostic mammogram performed on the same day. Spot magnification views demonstrated a 0.5 cm of grouped heterogeneous \par calcifications within the right breast at 8:00, posterior one third depth for which a stereotactic biopsy was recommended. \par \par A stereotactic biopsy performed on 3/15/2018 demonstrated the pathology results of LCIS. At the time of the biopsy, a buckle shaped biopsy marking clip was deployed. Post procedural mammogram is suboptimal in the CC projection due to positioning, making it difficult to determine if the clip is in appropriate position relative to the target site. Repeat CC \par recommended prior to needle localization. \par \par Additionally, upon evaluation of prior mammogram dated 3/8/2018, there are grouped coarse heterogeneous calcifications spanning 0.3 cm within the upper \par outer quadrant of the right breast at middle one third depth for which stereotactic biopsy is recommended for further evaluation \par Recommendations/Contemplated Plan of Action: \par 1. Indeterminate right breast upper outer quadrant calcifications for which \par stereotactic biopsy is recommended. \par 2. A mammographically guided wire localization of the buckle shaped clip \par may be performed on the day of surgery, pending repeat CC view to determine \par if the clip is in appropriate position relative to the target site. \par \par On 5/21/18, she had right breast lumpectomy. HISTOLOGY: FINAL DIAGNOSIS \par 1. BREAST, RIGHT ANTERIOR MASS, NEEDLE LOCALIZED LUMPECTOMY: \par - FOCAL LOBULAR CARCINOMA IN-SITU (LCIS), CLASSICAL TYPE AND ATYPICAL \par LOBULAR HYPERPLASIA (ALH); ASSOCIATED WITH RARE MICROCALCIFICATIONS. \par - ATROPHIC FATTY BREAST TISSUE WITH MILDLY PROLIFERATIVE TYPE FIBROCYTIC \par CHANGES, FOCAL MILD VASCULAR MEDIAL CALCIFIC SCLEROSIS (MONCKEBERG'S), AND A \par HEALING PRIOR BIOPSY SITE ASSOCIATED WITH ORGANIZING HEMATOMA FORMATION. \par \par 2. BREAST, RIGHT POSTERIOR MASS, NEEDLE LOCALIZED LUMPECTOMY: \par - SINGLE MICROSCOPIC FOCUS OF ATYPICAL DUCT HYPERPLASIA (ADH), SOLID TYPE, \par 0.8 MM, LOCATED 2.0 MM FROM THE NEAREST INFERIOR INKED SURGICAL MARGIN \par (MICROSCOPIC MEASUREMENTS). \par - FOCAL LOBULAR CARCINOMA IN-SITU (LCIS), CLASSICAL TYPE AND ATYPICAL \par LOBULAR HYPERPLASIA (ALH). \par - ATROPHIC FATTY BREAST TISSUE WITH PROLIFERATIVE TYPE \par FIBROCYSTIC CHANGES, FOCAL MAMMARY DUCT ECTASIA, AND A HEALING PRIOR BIOPSY \par SITE. \par \par 1/10/19:\par The patient is here for f/u visit. She is taking tamoxifen for LCIS since 12/2013. She had right breast lumpectomy x 2 in 5/2018 which showed LCIS, classical type and ALH. She complains b/l leg pain more when she is walking and better when her legs were elevated. She had endometrial biopsy and D&C in 6 2018. There was no evidence of malignancy.\par \par 5/30/19\par Patient is here today for follow up visit.  She started tamoxifen for LCIS in 12/2013 x 3 years, then restarted it again in 5/2018 after right breast lumpectomy which showed LCIS, classical type and ALH. She offers no new breast complaints.  However, she had another D&C done on 4/26/19 for thickened endometrial lining and possible polyp.  Low platelets were noted on pre-op, plt= 88 4/10/19, repeated plt=78  on 5/3/19.  She has been taking aspirin for 20 years.  No new meds were added.\par \par 8/21/19\par Patient is here today for follow up visit, She was found to have new onset thrombocytopenia, 70-80K. She discontinued her aspirin since last visit 3 months ago. Labs reviewed. Elevated ZKM=777, Haptoglobin <20. She had abdominal ultrasound on 6/10/19 which showed hepatomegaly and splenomegaly followed by MRI of abdomen on 8/2/19 which showed hepatomegaly 20.3 cm with diffuse fat disposition and cirrhosis.  No focal lesion. Splenomegaly 18.2 cm without focal lesions but splenic varices present, some of which drain to the left renal vein.  Cholelithiasis with multiple small gallstones. No abdominal ascites.  No abdominal adenopathy. She has her next appt with GI, Dr. Leal on 9/16/19.\par She was started tamoxifen for LCIS in 12/2013 x 3 years, then restarted it again in 5/2018 after right breast lumpectomy which showed LCIS, classical type and ALH. Last mammogram was done 5/1/19 which showed no evidence of malignancy.\par \par 10/25/19:\par Patient is here today for follow up visit, She was found to have new onset thrombocytopenia, 70-80K, elevated NKK=199, Haptoglobin <20, Eloina negative. She had abdominal ultrasound on 6/10/19 which showed hepatomegaly and splenomegaly followed by MRI of abdomen on 8/2/19 which showed hepatomegaly 20.3 cm with diffuse fat disposition and cirrhosis.  No focal lesion. Splenomegaly 18.2 cm without focal lesions but splenic varices present.  No abdominal adenopathy. \par She had liver biopsy on 10/3/19 which showed Liver, needle biopsy specimen shows micronodular cirrhosis withmoderate macrovesicular steatosis and mild lobular lymphocytic infiltration. Ballooned hepatocytes are rarely observed. Beth-Denk hyalins are not seen. The cirrhotic nodules are  by broad areas of multiacinar fibrosis replacing extinct parenchyma.The fibrotic areas, confirmed by trichrome stain, have mildductular reaction an mild lymphocytic infiltration. Iron stain is negative.\par Findings are consistent with alcoholic and/or non-alcoholic steatohepatitis with moderate steatosis, mild activity (grade 1of 3), and cirrhosis (stage 4 of 4).\par \par She has a history of LCIS right breast, s/p lumpectomy in 2013 and 2018, ADH left breast. She was on tamoxifen for LCIS in 12/2013 x 3 years, then restarted it again in 5/2018 after right breast lumpectomy which showed LCIS, classical type and ALH. Last mammogram was done 5/1/19 which showed no evidence of malignancy.\par \par 1/24/2020:\par Patient is here today for follow up visit, She was found to have thrombocytopenia, 70-80K, elevated RIV=538, Haptoglobin <20, Eloina negative. She had abdominal ultrasound on 6/10/19 which showed hepatomegaly and splenomegaly followed by MRI of abdomen on 8/2/19 which showed hepatomegaly 20.3 cm with diffuse fat disposition and cirrhosis.  No focal lesion. Splenomegaly 18.2 cm without focal lesions but splenic varices present.  No abdominal adenopathy. \par She had liver biopsy on 10/3/19 which showed micronodular cirrhosis with moderate macrovesicular steatosis and mild lobular lymphocytic infiltration. Ballooned hepatocytes are rarely observed. Beth-Denk hyalins are not seen. The cirrhotic nodules are  by broad areas of multiacinar fibrosis replacing extinct parenchyma.The fibrotic areas, confirmed by trichrome stain, have mildductular reaction an mild lymphocytic infiltration. Iron stain is negative.\par Findings are consistent with alcoholic and/or non-alcoholic steatohepatitis with moderate steatosis, mild activity (grade 1of 3), and cirrhosis (stage 4 of 4).\par \par To further evaluate etiology of thrombocytopenia, she had BM biopsy on 11/20/19 which showed Hypocellular bone marrow (20%) with maturing trilineage hematopoiesis. There was no dysplastic change and no increase blasts. FISH MDS panel and routine cytogenetics were normal. \par \par She has been followed by her gastroenterologist. She is on weight watch diet and has lost > 10 pounds. \par \par She has a history of LCIS right breast, s/p lumpectomy in 2013 and 2018, ADH left breast. She was on tamoxifen for LCIS in 12/2013 x 3 years, then restarted it again in 5/2018 after right breast lumpectomy which showed LCIS, classical type and ALH. Due to liver problems, she stopped tamoxifen and switched to Raloxifene. Last mammogram was done 5/1/19 which showed no evidence of malignancy.\par \par 7/24/2020:\par The patient is here for followup visit. She had LCIS right breast, s/p lumpectomy in 2013 and 2018 and ADH left breast. She was on Tamoxifen initially and switched to Raloxifene  due to liver problem. She was found to have hepatosplenomegaly and thrombocytopenia, likely due to non alcoholic steatohepatitis (GALEAS) and liver cirrhosis. She had BM biopsy on 11/20/19 which showed Hypocellular bone marrow (20%) with maturing trilineage hematopoiesis. There was no dysplastic change and no increase blasts. FISH MDS panel and routine cytogenetics were normal. The liver biopsy on 10/3/19 showed micronodular cirrhosis with moderate macrovesicular steatosis and mild lobular lymphocytic infiltration. Ballooned hepatocytes are rarely observed. Beth-Denk hyalins are not seen. The cirrhotic nodules are  by broad areas of multiacinar fibrosis replacing extinct parenchyma.The fibrotic areas, confirmed by trichrome stain, have mildductular reaction an mild lymphocytic infiltration. Iron stain is negative. Findings are consistent with alcoholic and/or non-alcoholic steatohepatitis with moderate steatosis, mild activity (grade 1of 3), and cirrhosis (stage 4 of 4).\par On 5/22/2020, she had b/l screening mammo which showed indeterminate calcifications in the left breast. On 5/27/2020, stereotactic guided biopsy showed calcified fibroadnematoid changes with focal atypical ductal hyperplasia.

## 2020-08-12 ENCOUNTER — OUTPATIENT (OUTPATIENT)
Dept: OUTPATIENT SERVICES | Facility: HOSPITAL | Age: 60
LOS: 1 days | Discharge: HOME | End: 2020-08-12

## 2020-08-12 DIAGNOSIS — Z98.890 OTHER SPECIFIED POSTPROCEDURAL STATES: Chronic | ICD-10-CM

## 2020-08-12 DIAGNOSIS — Z02.9 ENCOUNTER FOR ADMINISTRATIVE EXAMINATIONS, UNSPECIFIED: ICD-10-CM

## 2020-08-12 LAB — BLD GP AB SCN SERPL QL: SIGNIFICANT CHANGE UP

## 2020-08-14 ENCOUNTER — OUTPATIENT (OUTPATIENT)
Dept: OUTPATIENT SERVICES | Facility: HOSPITAL | Age: 60
LOS: 1 days | Discharge: HOME | End: 2020-08-14

## 2020-08-14 ENCOUNTER — LABORATORY RESULT (OUTPATIENT)
Age: 60
End: 2020-08-14

## 2020-08-14 DIAGNOSIS — Z98.890 OTHER SPECIFIED POSTPROCEDURAL STATES: Chronic | ICD-10-CM

## 2020-08-14 DIAGNOSIS — Z11.59 ENCOUNTER FOR SCREENING FOR OTHER VIRAL DISEASES: ICD-10-CM

## 2020-08-14 NOTE — ASU PATIENT PROFILE, ADULT - PSH
S/P dilation and curettage  x2  S/P lumpectomy of breast  MAY 2018 LRFT  S/P lumpectomy of breast  2013  bilateral  S/P tonsillectomy

## 2020-08-14 NOTE — ASU PATIENT PROFILE, ADULT - PMH
Chronic lower back pain  SCIATICA, PAIN RADIATING TO B/L LE  DM (diabetes mellitus)    HTN (hypertension)    Lobular carcinoma in situ (LCIS) of right breast    Nonalcoholic fatty liver disease without nonalcoholic steatohepatitis (GALEAS)    OA (osteoarthritis)    Obesity  bmi 47  Smoker    Venous insufficiency  ? CHR ULCER RT LE Chronic lower back pain  SCIATICA, PAIN RADIATING TO B/L LE  Cirrhosis of liver    DM (diabetes mellitus)    HTN (hypertension)    Lobular carcinoma in situ (LCIS) of right breast    Nonalcoholic fatty liver disease without nonalcoholic steatohepatitis (GALEAS)    OA (osteoarthritis)    Obesity  bmi 47  Smoker    Venous insufficiency  ? CHR ULCER RT LE

## 2020-08-17 ENCOUNTER — APPOINTMENT (OUTPATIENT)
Dept: BREAST CENTER | Facility: AMBULATORY SURGERY CENTER | Age: 60
End: 2020-08-17
Payer: COMMERCIAL

## 2020-08-17 ENCOUNTER — RESULT REVIEW (OUTPATIENT)
Age: 60
End: 2020-08-17

## 2020-08-17 ENCOUNTER — OUTPATIENT (OUTPATIENT)
Dept: OUTPATIENT SERVICES | Facility: HOSPITAL | Age: 60
LOS: 1 days | Discharge: HOME | End: 2020-08-17
Payer: COMMERCIAL

## 2020-08-17 VITALS
HEART RATE: 78 BPM | OXYGEN SATURATION: 98 % | SYSTOLIC BLOOD PRESSURE: 116 MMHG | DIASTOLIC BLOOD PRESSURE: 65 MMHG | RESPIRATION RATE: 18 BRPM

## 2020-08-17 VITALS
WEIGHT: 279.55 LBS | TEMPERATURE: 99 F | DIASTOLIC BLOOD PRESSURE: 69 MMHG | HEIGHT: 62 IN | OXYGEN SATURATION: 98 % | SYSTOLIC BLOOD PRESSURE: 128 MMHG | RESPIRATION RATE: 18 BRPM | HEART RATE: 70 BPM

## 2020-08-17 DIAGNOSIS — Z98.890 OTHER SPECIFIED POSTPROCEDURAL STATES: Chronic | ICD-10-CM

## 2020-08-17 DIAGNOSIS — Z90.89 ACQUIRED ABSENCE OF OTHER ORGANS: Chronic | ICD-10-CM

## 2020-08-17 LAB
ALBUMIN SERPL ELPH-MCNC: 3.8 G/DL — SIGNIFICANT CHANGE UP (ref 3.5–5.2)
ALP SERPL-CCNC: 74 U/L — SIGNIFICANT CHANGE UP (ref 30–115)
ALT FLD-CCNC: 19 U/L — SIGNIFICANT CHANGE UP (ref 0–41)
ANION GAP SERPL CALC-SCNC: 10 MMOL/L — SIGNIFICANT CHANGE UP (ref 7–14)
AST SERPL-CCNC: 29 U/L — SIGNIFICANT CHANGE UP (ref 0–41)
BILIRUB SERPL-MCNC: 1.2 MG/DL — SIGNIFICANT CHANGE UP (ref 0.2–1.2)
BLD GP AB SCN SERPL QL: SIGNIFICANT CHANGE UP
BUN SERPL-MCNC: 7 MG/DL — LOW (ref 10–20)
CALCIUM SERPL-MCNC: 8.8 MG/DL — SIGNIFICANT CHANGE UP (ref 8.5–10.1)
CHLORIDE SERPL-SCNC: 100 MMOL/L — SIGNIFICANT CHANGE UP (ref 98–110)
CO2 SERPL-SCNC: 26 MMOL/L — SIGNIFICANT CHANGE UP (ref 17–32)
CREAT SERPL-MCNC: <0.5 MG/DL — LOW (ref 0.7–1.5)
GLUCOSE SERPL-MCNC: 175 MG/DL — HIGH (ref 70–99)
HCT VFR BLD CALC: 42.8 % — SIGNIFICANT CHANGE UP (ref 37–47)
HGB BLD-MCNC: 14.9 G/DL — SIGNIFICANT CHANGE UP (ref 12–16)
MCHC RBC-ENTMCNC: 29.6 PG — SIGNIFICANT CHANGE UP (ref 27–31)
MCHC RBC-ENTMCNC: 34.8 G/DL — SIGNIFICANT CHANGE UP (ref 32–37)
MCV RBC AUTO: 85.1 FL — SIGNIFICANT CHANGE UP (ref 81–99)
NRBC # BLD: 0 /100 WBCS — SIGNIFICANT CHANGE UP (ref 0–0)
PLATELET # BLD AUTO: 76 K/UL — LOW (ref 130–400)
POTASSIUM SERPL-MCNC: 4.4 MMOL/L — SIGNIFICANT CHANGE UP (ref 3.5–5)
POTASSIUM SERPL-SCNC: 4.4 MMOL/L — SIGNIFICANT CHANGE UP (ref 3.5–5)
PROT SERPL-MCNC: 6.1 G/DL — SIGNIFICANT CHANGE UP (ref 6–8)
RBC # BLD: 5.03 M/UL — SIGNIFICANT CHANGE UP (ref 4.2–5.4)
RBC # FLD: 14.1 % — SIGNIFICANT CHANGE UP (ref 11.5–14.5)
SODIUM SERPL-SCNC: 136 MMOL/L — SIGNIFICANT CHANGE UP (ref 135–146)
WBC # BLD: 4.98 K/UL — SIGNIFICANT CHANGE UP (ref 4.8–10.8)
WBC # FLD AUTO: 4.98 K/UL — SIGNIFICANT CHANGE UP (ref 4.8–10.8)

## 2020-08-17 PROCEDURE — 19281 PERQ DEVICE BREAST 1ST IMAG: CPT | Mod: LT

## 2020-08-17 PROCEDURE — 19125 EXCISION BREAST LESION: CPT | Mod: LT

## 2020-08-17 PROCEDURE — 88305 TISSUE EXAM BY PATHOLOGIST: CPT | Mod: 26

## 2020-08-17 RX ORDER — MORPHINE SULFATE 50 MG/1
2 CAPSULE, EXTENDED RELEASE ORAL
Refills: 0 | Status: DISCONTINUED | OUTPATIENT
Start: 2020-08-17 | End: 2020-08-17

## 2020-08-17 RX ORDER — OXYCODONE AND ACETAMINOPHEN 5; 325 MG/1; MG/1
5-325 TABLET ORAL
Qty: 15 | Refills: 0 | Status: ACTIVE | COMMUNITY
Start: 2020-08-17 | End: 1900-01-01

## 2020-08-17 RX ORDER — SODIUM CHLORIDE 9 MG/ML
1000 INJECTION, SOLUTION INTRAVENOUS
Refills: 0 | Status: DISCONTINUED | OUTPATIENT
Start: 2020-08-17 | End: 2020-09-01

## 2020-08-17 RX ADMIN — SODIUM CHLORIDE 100 MILLILITER(S): 9 INJECTION, SOLUTION INTRAVENOUS at 12:21

## 2020-08-17 NOTE — BRIEF OPERATIVE NOTE - NSICDXBRIEFPROCEDURE_GEN_ALL_CORE_FT
PROCEDURES:  Lumpectomy of breast after needle localization with mammographic guidance 17-Aug-2020 12:12:26  Ann Jones

## 2020-08-17 NOTE — CHART NOTE - NSCHARTNOTEFT_GEN_A_CORE
PACU ANESTHESIA ADMISSION NOTE      ____ Intubated  TV:______       Rate: ______      FiO2: ______    __x__ Patent Airway    ___x_ Full return of protective reflexes    ____ Full recovery from anesthesia / sedation to baseline status    Vitals:  HR 87  BP 90/55   RR 15  O2sat. 98%  Temp: 36.5C    Mental Status:  __x__ Awake   _____ Alert   ___x__ Drowsy   _____ Sedated    Nausea/Vomiting: ____ Yes, See Post - Op Orders      _x___ No    Pain Scale (0-10): __x___    Treatment: ____ None    __x__ See Post - Op/PCA Orders    Post - Operative Fluids:   ____ Oral   _x___ See Post - Op Orders    Plan:  Discharge to:   _x___Home       _____Floor      _____Critical Care    _____ Other:_________________    Comments: s/p IV sedation. No anesthesia complications. Pt's condition is stable in PACU. Full report is given to PACU RN.

## 2020-08-18 DIAGNOSIS — N60.92 UNSPECIFIED BENIGN MAMMARY DYSPLASIA OF LEFT BREAST: ICD-10-CM

## 2020-08-18 DIAGNOSIS — Z51.81 ENCOUNTER FOR THERAPEUTIC DRUG LEVEL MONITORING: ICD-10-CM

## 2020-08-18 DIAGNOSIS — D69.6 THROMBOCYTOPENIA, UNSPECIFIED: ICD-10-CM

## 2020-08-18 DIAGNOSIS — D05.01 LOBULAR CARCINOMA IN SITU OF RIGHT BREAST: ICD-10-CM

## 2020-08-21 LAB — SURGICAL PATHOLOGY STUDY: SIGNIFICANT CHANGE UP

## 2020-08-25 DIAGNOSIS — Z85.3 PERSONAL HISTORY OF MALIGNANT NEOPLASM OF BREAST: ICD-10-CM

## 2020-08-25 DIAGNOSIS — N63.20 UNSPECIFIED LUMP IN THE LEFT BREAST, UNSPECIFIED QUADRANT: ICD-10-CM

## 2020-08-25 DIAGNOSIS — K75.81 NONALCOHOLIC STEATOHEPATITIS (NASH): ICD-10-CM

## 2020-08-25 DIAGNOSIS — N60.42 MAMMARY DUCT ECTASIA OF LEFT BREAST: ICD-10-CM

## 2020-08-25 DIAGNOSIS — R92.1 MAMMOGRAPHIC CALCIFICATION FOUND ON DIAGNOSTIC IMAGING OF BREAST: ICD-10-CM

## 2020-08-25 DIAGNOSIS — N60.22 FIBROADENOSIS OF LEFT BREAST: ICD-10-CM

## 2020-08-25 DIAGNOSIS — F17.210 NICOTINE DEPENDENCE, CIGARETTES, UNCOMPLICATED: ICD-10-CM

## 2020-08-25 DIAGNOSIS — M19.90 UNSPECIFIED OSTEOARTHRITIS, UNSPECIFIED SITE: ICD-10-CM

## 2020-08-25 DIAGNOSIS — E11.9 TYPE 2 DIABETES MELLITUS WITHOUT COMPLICATIONS: ICD-10-CM

## 2020-08-25 DIAGNOSIS — I10 ESSENTIAL (PRIMARY) HYPERTENSION: ICD-10-CM

## 2020-08-25 DIAGNOSIS — E66.9 OBESITY, UNSPECIFIED: ICD-10-CM

## 2020-08-25 DIAGNOSIS — K74.60 UNSPECIFIED CIRRHOSIS OF LIVER: ICD-10-CM

## 2020-09-03 ENCOUNTER — APPOINTMENT (OUTPATIENT)
Dept: BREAST CENTER | Facility: CLINIC | Age: 60
End: 2020-09-03
Payer: COMMERCIAL

## 2020-09-03 PROBLEM — K74.60 UNSPECIFIED CIRRHOSIS OF LIVER: Chronic | Status: ACTIVE | Noted: 2020-08-17

## 2020-09-03 PROCEDURE — 99024 POSTOP FOLLOW-UP VISIT: CPT

## 2020-09-03 NOTE — HISTORY OF PRESENT ILLNESS
[FreeTextEntry1] : PMD: Dr. Eliazar Blood\par GYN: Dr. Areli Richardson\par \par Patient with Right breast classical type LCIS on stereotactic core biopsy 9/9/13; 10:00 N10, 12 mm.\par Left breast hyalinized fibroadenoma, ALH on stereotactic core biopsy 9/9/13; 7:00 N7, 2 mm.\par Left breast fibroadenoma with calcifications on stereotactic core biopsy 9/12/13; Lateral.\par Right breast intraductal papilloma on ultrasound guided core biopsy 10/16/13; 9:00 N1.  \par Right adipose breast tissue showing nodular stromal sclerosis on ultrasound guided core biopsy 10/16/13; 1:00 N2.  \par Bilateral axillary FNA 10/16/13 demonstrating lymphocytes and macrophages.\par Left breast cystic changes with focal duct hyperplasia usual type, sclerosing adenosis on MR guided core biopsy 10/23/13; 12:00.  \par \par Her family history is significant for her maternal aunt with breast cancer at 70; and her father with colon cancer.  \par Her Pepper Model risk assessment is:\par 4.0 % in five years \par 17.1 % in her lifetime; recalculated 7/16/20.\par She refuses high risk screening with bilateral breast MRI due to the inability to lie prone on the MRI table.  \par \par Status post Right NLOC of two areas, Left NLOC 12/2/13 demonstrating Right 9:00 LCIS classical type, ALH, radial sclerosing lesion; Right 10:00 LCIS classical and fibrocystic changes; Left focal ADH, micropapillary and columnar cell involving a radial scar.\par \par Right breast LCIS classical type on stereotactic biopsy core 3/15/18; 8:00 (buckle).\par Status post Right NLOC of two areas 05/21/18 demonstrating anterior mass focal LCIS classical type and LCIS and posterior mass with ADH, classical type LCIS and ALH.  \par \par Patient with left breast calcified fibroadenomatoid changes with focal Atypical Ductal Hyperplasia (ADH) on stereotactic guided biopsy on 5/27/20; 7:00 (buckle) \par \par Was on Tamoxifen for five years with Dr. Yusuf, switched to Raloxifene in January 2020.  \par \par s/p Left breast NLOC - 08/17/2020\par \par Pt presents to the office for her initial post-operative visit.\par She is feeling well.\par She denies any fever / chills or erythema and / or drainage related to the incision.\par She had no bleeding following procedure.\par Her pain is well controlled, only complains of mild soreness of the area.\par

## 2020-09-03 NOTE — REVIEW OF SYSTEMS
[Negative] : Constitutional [Breast Pain] : no breast pain [Chills] : no chills [Fever] : no fever [Breast Lump] : no breast lump [Breast Swelling] : no breast swelling [Breast Reddening] : no reddening of the breast

## 2020-09-03 NOTE — ASSESSMENT
[FreeTextEntry1] : GABRIELE is a ozzie 60 year old patient who presented today for her initial post-operative visit.\par She is status post a left breast NLOC on 08/17/2020 for ADH.\par She is feeling well.\par She denies any fever / chills or erythema and / or drainage related to the incision.\par Her pain is well controlled, only complains of mild soreness of the area.\par Her sutures were removed and pathology was discussed.\par \par Plan:\par 1. B/L Dx Mammo in May 2021, this will be scheduled today.\par 2. Follow-up and clinical breast exam in May 2021. \par 3. Continue follow-up with Dr. Yusuf.\par \par I spent a total of 15 minutes of face to face time with this patient, greater than 50% of which was spent in counseling and/or coordination of care.\par All of her questions were appropriately answered.\par She knows to call with any concerns.

## 2020-09-03 NOTE — DATA REVIEWED
[FreeTextEntry1] : Surgical Final Report\par Final Diagnosis\par 1. Left breast mass:\par - Minute residual focus of atypical ductal hyperplasia (ADH).\par - Previous biopsy site changes.\par - Focus of atypical lobular hyperplasia (ALH)\par - Focal calcifications.\par - Fibrocystic changes / focal apocrine metaplasia.\par - Adenosis\par \par 2. Left breast inferior margin:\par - Previous biopsy site with hemosiderin and marked fibroblastic\par tissue reaction.\par - No malignancy is seen.\par

## 2020-09-03 NOTE — PAST MEDICAL HISTORY
[Menarche Age ____] : age at menarche was [unfilled] [Menopause Age____] : age at menopause was [unfilled] [Total Preg ___] : G[unfilled] [Live Births ___] : P[unfilled]  [Age At Live Birth ___] : Age at live birth: [unfilled] [History of Hormone Replacement Treatment] : has no history of hormone replacement treatment [FreeTextEntry5] : none [FreeTextEntry7] : OCP for a few months, and discontinued 1980's. [FreeTextEntry8] : none [FreeTextEntry6] : none

## 2020-09-03 NOTE — REASON FOR VISIT
[Post Op: _________] : a [unfilled] post op visit [FreeTextEntry1] : s/p Left breast NLOC - 08/17/2020\par s/p Left breast NLOC - 08/17/2020

## 2020-09-23 NOTE — ASU PATIENT PROFILE, ADULT - PAIN CHRONIC, PROFILE
History Of Present Illness    2019 Ms. Daphnie Riddle is here for f/u of chronic diarrhea and abdominal pain. She has a very complicated GI history. She has been treated by Dr. Garcia most recently. She had a colon perforation several years ago with subsequent right sided colon resection. She had an ostomy that was able to be reversed. She then had a ventral hernia that was repaired with mesh. She has had lower abdominal pain since. She had a CT scan with a low ventral hernia with a loop of SB. This is non obstructive at this time. She is taking bentyl with some improvement.  She has been having terrible diarrhea multiple times a day since her colon surgery.  She had normal stool studies and labs with a positive t-transglutaminase (tTG) IgG and elevated inflammatory markers. She had a normal EGD and colonoscopy with normal random bx. She is having some improvement with lomotil and cholestryramine. Her reflux is a little worse today. CS  2019 Ms. Riddle is here for f/u of diarrhea and GERD. Her cholestyramine was increased at her last OV. Her bowels are now fairly normal. She can now leave the house without worrying about having an accident. She continues to have some mid abdominal cramping. She is not taking the bentyl. She was started on zantac at her last OV and now her reflux is well controlled. Overall she is feeling better today. CS  2019 Ms. Riddle is here for diarrhea and abdominal pain. Her bowels are fairly normal with questran QID and lomotil prn. She has had to use the lomotil more frequently since her hernia surgery on the . She is in a lot of abdominal pain. This is around her ribs and incisions. She follows up with her surgeon 1/3. She is taking the bentyl 10mg BID. She has not noticed a change but not sure since she had surgery. Her reflux is not controlled on pepcid 20mg BID- worse at night. CS  3/9/2020 Ms. Riddle is here for f/u of abdominal pain and diarrhea. She was last seen in December after her hernia surgery. She saw Dr. Ritter soon after. She had a CT scan that showed inflammation of her colon along an area of a diverticuli. He ordered stool studies and was going to treat for diverticulitis if negative. She could not do the stool studies due to transportation. She was advised to f/u with us. She continues to have mid abdominal pain worse on the right midline. She continues to have diarrhea worse after eating despite choelstryramine and bentyl. She admits to very high stress. Her mother  last week and prior to that her aunt. She is the caregiver of her grandson with ADHD. CS     Daphnie returns for follow up. She is doing ok  but still having pain. She has not had stool studies sent in as she has had three deaths in the family and dealing with children during the COVID issues. She is tolerating symptoms relatively well. Seems to be managing relatively well.  She has a lot of stress with her family in the house during this shelter at home time.  Her symptoms are tolerable but she is needing medications refilled.  She needs refills for lomoil, questran, AMT 10mg, bentyl.  I spent 7 minutes on telephone call as she could not get Televideo appointment set up properly
back,knees, hip/yes

## 2020-10-21 NOTE — ASU PREOP CHECKLIST - BP NONINVASIVE DIASTOLIC (MM HG)
67
Implemented All Fall with Harm Risk Interventions:  [place] to call system. Call bell, personal items and telephone within reach. Instruct patient to call for assistance. Room bathroom lighting operational. Non-slip footwear when patient is off stretcher. Physically safe environment: no spills, clutter or unnecessary equipment. Stretcher in lowest position, wheels locked, appropriate side rails in place. Provide visual cue, wrist band, yellow gown, etc. Monitor gait and stability. Monitor for mental status changes and reorient to person, place, and time. Review medications for side effects contributing to fall risk. Reinforce activity limits and safety measures with patient and family. Provide visual clues: red socks.

## 2021-01-22 ENCOUNTER — OUTPATIENT (OUTPATIENT)
Dept: OUTPATIENT SERVICES | Facility: HOSPITAL | Age: 61
LOS: 1 days | Discharge: HOME | End: 2021-01-22

## 2021-01-22 ENCOUNTER — LABORATORY RESULT (OUTPATIENT)
Age: 61
End: 2021-01-22

## 2021-01-22 ENCOUNTER — APPOINTMENT (OUTPATIENT)
Dept: HEMATOLOGY ONCOLOGY | Facility: CLINIC | Age: 61
End: 2021-01-22
Payer: COMMERCIAL

## 2021-01-22 VITALS
HEART RATE: 80 BPM | WEIGHT: 283 LBS | DIASTOLIC BLOOD PRESSURE: 82 MMHG | SYSTOLIC BLOOD PRESSURE: 112 MMHG | HEIGHT: 62 IN | BODY MASS INDEX: 52.08 KG/M2 | TEMPERATURE: 96 F

## 2021-01-22 DIAGNOSIS — Z98.890 OTHER SPECIFIED POSTPROCEDURAL STATES: Chronic | ICD-10-CM

## 2021-01-22 DIAGNOSIS — Z90.89 ACQUIRED ABSENCE OF OTHER ORGANS: Chronic | ICD-10-CM

## 2021-01-22 DIAGNOSIS — N60.92 UNSPECIFIED BENIGN MAMMARY DYSPLASIA OF LEFT BREAST: ICD-10-CM

## 2021-01-22 PROCEDURE — 99214 OFFICE O/P EST MOD 30 MIN: CPT

## 2021-01-22 RX ORDER — RALOXIFENE HYDROCHLORIDE 60 MG/1
60 TABLET, FILM COATED ORAL DAILY
Qty: 90 | Refills: 2 | Status: ACTIVE | COMMUNITY
Start: 2019-10-30 | End: 1900-01-01

## 2021-01-26 ENCOUNTER — APPOINTMENT (OUTPATIENT)
Dept: BREAST CENTER | Facility: CLINIC | Age: 61
End: 2021-01-26
Payer: COMMERCIAL

## 2021-01-26 VITALS
DIASTOLIC BLOOD PRESSURE: 88 MMHG | HEIGHT: 62 IN | SYSTOLIC BLOOD PRESSURE: 136 MMHG | TEMPERATURE: 98.2 F | WEIGHT: 283 LBS | BODY MASS INDEX: 52.08 KG/M2

## 2021-01-26 PROCEDURE — 99213 OFFICE O/P EST LOW 20 MIN: CPT

## 2021-01-26 PROCEDURE — 99072 ADDL SUPL MATRL&STAF TM PHE: CPT

## 2021-01-26 NOTE — ASSESSMENT
[FreeTextEntry1] : GABRIELE is a ozzie 60 year old patient who presented today for an acute visit.\par She is status post a left breast NLOC on 08/17/2020 for ADH.\par She has had some drainage from surgical site.\par On physical exam, 3mm nonhealing sinus - left breast surgical wound. No evidence of infection.\par Silver nitrate applied.\par \par Plan:\par 1. B/L Dx Mammo in May 2021, this will be scheduled today.\par 2. Follow-up and clinical breast exam in May 2021. \par \par If area is not healing within the next 6-8 weeks pt was told to contact office. \par \par I spent a total of 15 minutes of face to face time with this patient, greater than 50% of which was spent in counseling and/or coordination of care.\par All of her questions were appropriately answered.\par She knows to call with any concerns. no

## 2021-01-26 NOTE — REVIEW OF SYSTEMS
[Negative] : Neurological [FreeTextEntry2] : She has oozing from Lt breast Sx site - serosanguineous discharge

## 2021-01-26 NOTE — ASSESSMENT
[FreeTextEntry1] : ASSESSMENT:  \par -- Hepatosplenomegaly and thrombocytopenia, likely due to non alcoholic steatohepatitis (GALEAS) and liver cirrhosis.  BM biopsy on 11/20/19- showed Hypocellular bone marrow (20%) with maturing trilineage hematopoiesis. No dysplasia or blasts were seen. \par -- LCIS right breast, s/p lumpectomy in 2013 and 2018\par -- ADH and ALH of  left breast s/p lumpectomy in Aug 2020 \par -- BCC skin LE\par \par PLAN: \par -- Continue Raloxifene daily for breast cancer prevention. Pt is compliant \par Advised to f.u with Dr Jones for oozing from Sx site \par -- Continue b/l breast imaging surveillance. - She requires b/l diagnostic mammogram- wants to get script from Dr Jones when she sees her. Has appt next week. \par -- Chronic thrombocytopenia. CBC reviewed and PLT is stable- Plt-90K today. Continue observation.\par -- Followup with gastroenterologist Dr. Carrillo for GALEAS. Encouraged healthy diet and weight reduction.  \par -- RTC in 6 months. \par \par Pt was seen and examined and discussed with .\par \par

## 2021-01-26 NOTE — REVIEW OF SYSTEMS
[Negative] : Constitutional [Fever] : no fever [Breast Pain] : no breast pain [Chills] : no chills [Breast Lump] : no breast lump [de-identified] : drainage from left breast surgical site.

## 2021-01-26 NOTE — PHYSICAL EXAM
[Restricted in physically strenuous activity but ambulatory and able to carry out work of a light or sedentary nature] : Status 1- Restricted in physically strenuous activity but ambulatory and able to carry out work of a light or sedentary nature, e.g., light house work, office work [Obese] : obese [Normal] : affect appropriate [de-identified] : Right breast lumpectomy scar, There is no palpable mass or skin lesion. Lt breast - oozing of serosanguineous discharge of Lt breast scar site . No palpable masses [de-identified] : liver edge palpated

## 2021-01-26 NOTE — HISTORY OF PRESENT ILLNESS
[de-identified] : 58-year-old female with a history of lobular carcinoma in situ of the right breast and focal atypical ductal hyperplasia of the left breast status post bilateral lumpectomy.  She has been on tamoxifen since 12/2013 and tolerated well.  She is menopausal.  On 08/02/2016, she had a bilateral breast MRI with and without contrast at [facility].  There was no abnormal finding.  On 01/08/2016, she had a bilateral screening mammogram.  There was no abnormal finding.\par  [de-identified] : The patient had a screening mammogram on 3/8/2018 where calcifications were identified within the 8:00 axis of the right breast at posterior one third depth. \par \par She was subsequently underwent a diagnostic mammogram performed on the same day. Spot magnification views demonstrated a 0.5 cm of grouped heterogeneous \par calcifications within the right breast at 8:00, posterior one third depth for which a stereotactic biopsy was recommended. \par \par A stereotactic biopsy performed on 3/15/2018 demonstrated the pathology results of LCIS. At the time of the biopsy, a buckle shaped biopsy marking clip was deployed. Post procedural mammogram is suboptimal in the CC projection due to positioning, making it difficult to determine if the clip is in appropriate position relative to the target site. Repeat CC \par recommended prior to needle localization. \par \par Additionally, upon evaluation of prior mammogram dated 3/8/2018, there are grouped coarse heterogeneous calcifications spanning 0.3 cm within the upper \par outer quadrant of the right breast at middle one third depth for which stereotactic biopsy is recommended for further evaluation \par Recommendations/Contemplated Plan of Action: \par 1. Indeterminate right breast upper outer quadrant calcifications for which \par stereotactic biopsy is recommended. \par 2. A mammographically guided wire localization of the buckle shaped clip \par may be performed on the day of surgery, pending repeat CC view to determine \par if the clip is in appropriate position relative to the target site. \par \par On 5/21/18, she had right breast lumpectomy. HISTOLOGY: FINAL DIAGNOSIS \par 1. BREAST, RIGHT ANTERIOR MASS, NEEDLE LOCALIZED LUMPECTOMY: \par - FOCAL LOBULAR CARCINOMA IN-SITU (LCIS), CLASSICAL TYPE AND ATYPICAL \par LOBULAR HYPERPLASIA (ALH); ASSOCIATED WITH RARE MICROCALCIFICATIONS. \par - ATROPHIC FATTY BREAST TISSUE WITH MILDLY PROLIFERATIVE TYPE FIBROCYTIC \par CHANGES, FOCAL MILD VASCULAR MEDIAL CALCIFIC SCLEROSIS (MONCKEBERG'S), AND A \par HEALING PRIOR BIOPSY SITE ASSOCIATED WITH ORGANIZING HEMATOMA FORMATION. \par \par 2. BREAST, RIGHT POSTERIOR MASS, NEEDLE LOCALIZED LUMPECTOMY: \par - SINGLE MICROSCOPIC FOCUS OF ATYPICAL DUCT HYPERPLASIA (ADH), SOLID TYPE, \par 0.8 MM, LOCATED 2.0 MM FROM THE NEAREST INFERIOR INKED SURGICAL MARGIN \par (MICROSCOPIC MEASUREMENTS). \par - FOCAL LOBULAR CARCINOMA IN-SITU (LCIS), CLASSICAL TYPE AND ATYPICAL \par LOBULAR HYPERPLASIA (ALH). \par - ATROPHIC FATTY BREAST TISSUE WITH PROLIFERATIVE TYPE \par FIBROCYSTIC CHANGES, FOCAL MAMMARY DUCT ECTASIA, AND A HEALING PRIOR BIOPSY \par SITE. \par \par 1/10/19:\par The patient is here for f/u visit. She is taking tamoxifen for LCIS since 12/2013. She had right breast lumpectomy x 2 in 5/2018 which showed LCIS, classical type and ALH. She complains b/l leg pain more when she is walking and better when her legs were elevated. She had endometrial biopsy and D&C in 6 2018. There was no evidence of malignancy.\par \par 5/30/19\par Patient is here today for follow up visit.  She started tamoxifen for LCIS in 12/2013 x 3 years, then restarted it again in 5/2018 after right breast lumpectomy which showed LCIS, classical type and ALH. She offers no new breast complaints.  However, she had another D&C done on 4/26/19 for thickened endometrial lining and possible polyp.  Low platelets were noted on pre-op, plt= 88 4/10/19, repeated plt=78  on 5/3/19.  She has been taking aspirin for 20 years.  No new meds were added.\par \par 8/21/19\par Patient is here today for follow up visit, She was found to have new onset thrombocytopenia, 70-80K. She discontinued her aspirin since last visit 3 months ago. Labs reviewed. Elevated BXM=020, Haptoglobin <20. She had abdominal ultrasound on 6/10/19 which showed hepatomegaly and splenomegaly followed by MRI of abdomen on 8/2/19 which showed hepatomegaly 20.3 cm with diffuse fat disposition and cirrhosis.  No focal lesion. Splenomegaly 18.2 cm without focal lesions but splenic varices present, some of which drain to the left renal vein.  Cholelithiasis with multiple small gallstones. No abdominal ascites.  No abdominal adenopathy. She has her next appt with GI, Dr. Leal on 9/16/19.\par She was started tamoxifen for LCIS in 12/2013 x 3 years, then restarted it again in 5/2018 after right breast lumpectomy which showed LCIS, classical type and ALH. Last mammogram was done 5/1/19 which showed no evidence of malignancy.\par \par 10/25/19:\par Patient is here today for follow up visit, She was found to have new onset thrombocytopenia, 70-80K, elevated DFX=431, Haptoglobin <20, Eloina negative. She had abdominal ultrasound on 6/10/19 which showed hepatomegaly and splenomegaly followed by MRI of abdomen on 8/2/19 which showed hepatomegaly 20.3 cm with diffuse fat disposition and cirrhosis.  No focal lesion. Splenomegaly 18.2 cm without focal lesions but splenic varices present.  No abdominal adenopathy. \par She had liver biopsy on 10/3/19 which showed Liver, needle biopsy specimen shows micronodular cirrhosis withmoderate macrovesicular steatosis and mild lobular lymphocytic infiltration. Ballooned hepatocytes are rarely observed. Beth-Denk hyalins are not seen. The cirrhotic nodules are  by broad areas of multiacinar fibrosis replacing extinct parenchyma.The fibrotic areas, confirmed by trichrome stain, have mildductular reaction an mild lymphocytic infiltration. Iron stain is negative.\par Findings are consistent with alcoholic and/or non-alcoholic steatohepatitis with moderate steatosis, mild activity (grade 1of 3), and cirrhosis (stage 4 of 4).\par \par She has a history of LCIS right breast, s/p lumpectomy in 2013 and 2018, ADH left breast. She was on tamoxifen for LCIS in 12/2013 x 3 years, then restarted it again in 5/2018 after right breast lumpectomy which showed LCIS, classical type and ALH. Last mammogram was done 5/1/19 which showed no evidence of malignancy.\par \par 1/24/2020:\par Patient is here today for follow up visit, She was found to have thrombocytopenia, 70-80K, elevated JIE=601, Haptoglobin <20, Eloina negative. She had abdominal ultrasound on 6/10/19 which showed hepatomegaly and splenomegaly followed by MRI of abdomen on 8/2/19 which showed hepatomegaly 20.3 cm with diffuse fat disposition and cirrhosis.  No focal lesion. Splenomegaly 18.2 cm without focal lesions but splenic varices present.  No abdominal adenopathy. \par She had liver biopsy on 10/3/19 which showed micronodular cirrhosis with moderate macrovesicular steatosis and mild lobular lymphocytic infiltration. Ballooned hepatocytes are rarely observed. Beth-Denk hyalins are not seen. The cirrhotic nodules are  by broad areas of multiacinar fibrosis replacing extinct parenchyma.The fibrotic areas, confirmed by trichrome stain, have mildductular reaction an mild lymphocytic infiltration. Iron stain is negative.\par Findings are consistent with alcoholic and/or non-alcoholic steatohepatitis with moderate steatosis, mild activity (grade 1of 3), and cirrhosis (stage 4 of 4).\par \par To further evaluate etiology of thrombocytopenia, she had BM biopsy on 11/20/19 which showed Hypocellular bone marrow (20%) with maturing trilineage hematopoiesis. There was no dysplastic change and no increase blasts. FISH MDS panel and routine cytogenetics were normal. \par \par She has been followed by her gastroenterologist. She is on weight watch diet and has lost > 10 pounds. \par \par She has a history of LCIS right breast, s/p lumpectomy in 2013 and 2018, ADH left breast. She was on tamoxifen for LCIS in 12/2013 x 3 years, then restarted it again in 5/2018 after right breast lumpectomy which showed LCIS, classical type and ALH. Due to liver problems, she stopped tamoxifen and switched to Raloxifene. Last mammogram was done 5/1/19 which showed no evidence of malignancy.\par \par 7/24/2020:\par The patient is here for followup visit. She had LCIS right breast, s/p lumpectomy in 2013 and 2018 and ADH left breast. She was on Tamoxifen initially and switched to Raloxifene  due to liver problem. She was found to have hepatosplenomegaly and thrombocytopenia, likely due to non alcoholic steatohepatitis (GALEAS) and liver cirrhosis. She had BM biopsy on 11/20/19 which showed Hypocellular bone marrow (20%) with maturing trilineage hematopoiesis. There was no dysplastic change and no increase blasts. FISH MDS panel and routine cytogenetics were normal. The liver biopsy on 10/3/19 showed micronodular cirrhosis with moderate macrovesicular steatosis and mild lobular lymphocytic infiltration. Ballooned hepatocytes are rarely observed. Beth-Denk hyalins are not seen. The cirrhotic nodules are  by broad areas of multiacinar fibrosis replacing extinct parenchyma.The fibrotic areas, confirmed by trichrome stain, have mildductular reaction an mild lymphocytic infiltration. Iron stain is negative. Findings are consistent with alcoholic and/or non-alcoholic steatohepatitis with moderate steatosis, mild activity (grade 1of 3), and cirrhosis (stage 4 of 4).\par On 5/22/2020, she had b/l screening mammo which showed indeterminate calcifications in the left breast. On 5/27/2020, stereotactic guided biopsy showed calcified fibroadnematoid changes with focal atypical ductal hyperplasia. \par \par 1/22/21- Patient is here for follow up. She is doing well overall. She had lumpectomy of Lt breast in Aug 2020 and Bx showed ADH and ALH. She continues with Raloxifene. Tolerating well. No issues. She has had on and off oozing from the Sx site on the left breast that resolves on its own. It started oozing 3 days ago- fluid stained with pinkish stain

## 2021-01-26 NOTE — HISTORY OF PRESENT ILLNESS
[FreeTextEntry1] : PMD: Dr. Eliazar Blood\par GYN: Dr. Areli Richardson\par \par Patient with Right breast classical type LCIS on stereotactic core biopsy 9/9/13; 10:00 N10, 12 mm.\par Left breast hyalinized fibroadenoma, ALH on stereotactic core biopsy 9/9/13; 7:00 N7, 2 mm.\par Left breast fibroadenoma with calcifications on stereotactic core biopsy 9/12/13; Lateral.\par Right breast intraductal papilloma on ultrasound guided core biopsy 10/16/13; 9:00 N1.  \par Right adipose breast tissue showing nodular stromal sclerosis on ultrasound guided core biopsy 10/16/13; 1:00 N2.  \par Bilateral axillary FNA 10/16/13 demonstrating lymphocytes and macrophages.\par Left breast cystic changes with focal duct hyperplasia usual type, sclerosing adenosis on MR guided core biopsy 10/23/13; 12:00.  \par \par Her family history is significant for her maternal aunt with breast cancer at 70; and her father with colon cancer.  \par Her Pepper Model risk assessment is:\par 4.0 % in five years \par 17.1 % in her lifetime; recalculated 7/16/20.\par She refuses high risk screening with bilateral breast MRI due to the inability to lie prone on the MRI table.  \par \par Status post Right NLOC of two areas, Left NLOC 12/2/13 demonstrating Right 9:00 LCIS classical type, ALH, radial sclerosing lesion; Right 10:00 LCIS classical and fibrocystic changes; Left focal ADH, micropapillary and columnar cell involving a radial scar.\par \par Right breast LCIS classical type on stereotactic biopsy core 3/15/18; 8:00 (buckle).\par Status post Right NLOC of two areas 05/21/18 demonstrating anterior mass focal LCIS classical type and LCIS and posterior mass with ADH, classical type LCIS and ALH.  \par \par Patient with left breast calcified fibroadenomatoid changes with focal Atypical Ductal Hyperplasia (ADH) on stereotactic guided biopsy on 5/27/20; 7:00 (buckle) \par \par Was on Tamoxifen for five years with Dr. Yusuf, switched to Raloxifene in January 2020.  \par \par s/p Left breast NLOC - 08/17/2020 = Minute residual focus of atypical ductal hyperplasia (ADH). Previous biopsy site changes. Focus of atypical lobular hyperplasia (ALH). Focal calcifications. Fibrocystic changes.\par \par \par

## 2021-02-11 DIAGNOSIS — N60.99 UNSPECIFIED BENIGN MAMMARY DYSPLASIA OF UNSPECIFIED BREAST: ICD-10-CM

## 2021-02-11 DIAGNOSIS — D05.01 LOBULAR CARCINOMA IN SITU OF RIGHT BREAST: ICD-10-CM

## 2021-02-11 DIAGNOSIS — Z51.81 ENCOUNTER FOR THERAPEUTIC DRUG LEVEL MONITORING: ICD-10-CM

## 2021-02-11 DIAGNOSIS — D69.6 THROMBOCYTOPENIA, UNSPECIFIED: ICD-10-CM

## 2021-06-15 ENCOUNTER — NON-APPOINTMENT (OUTPATIENT)
Age: 61
End: 2021-06-15

## 2021-06-17 ENCOUNTER — NON-APPOINTMENT (OUTPATIENT)
Age: 61
End: 2021-06-17

## 2021-06-18 ENCOUNTER — APPOINTMENT (OUTPATIENT)
Dept: BREAST CENTER | Facility: CLINIC | Age: 61
End: 2021-06-18
Payer: COMMERCIAL

## 2021-06-18 VITALS
DIASTOLIC BLOOD PRESSURE: 80 MMHG | WEIGHT: 283 LBS | TEMPERATURE: 98.7 F | HEIGHT: 62 IN | SYSTOLIC BLOOD PRESSURE: 132 MMHG | BODY MASS INDEX: 52.08 KG/M2

## 2021-06-18 DIAGNOSIS — B49 UNSPECIFIED MYCOSIS: ICD-10-CM

## 2021-06-18 DIAGNOSIS — Z12.39 ENCOUNTER FOR OTHER SCREENING FOR MALIGNANT NEOPLASM OF BREAST: ICD-10-CM

## 2021-06-18 PROCEDURE — 99212 OFFICE O/P EST SF 10 MIN: CPT

## 2021-06-18 PROCEDURE — 99072 ADDL SUPL MATRL&STAF TM PHE: CPT

## 2021-06-18 RX ORDER — KETOCONAZOLE 20 MG/G
2 CREAM TOPICAL TWICE DAILY
Qty: 60 | Refills: 1 | Status: ACTIVE | COMMUNITY
Start: 2021-06-18 | End: 1900-01-01

## 2021-06-18 NOTE — REASON FOR VISIT
[Follow-Up: _____] : a [unfilled] follow-up visit [FreeTextEntry1] : history of LCIS, ALH, ADH, radial scar; imaging review.

## 2021-06-18 NOTE — PHYSICAL EXAM
[Normocephalic] : normocephalic [Atraumatic] : atraumatic [No Supraclavicular Adenopathy] : no supraclavicular adenopathy [No dominant masses] : no dominant masses in right breast  [No dominant masses] : no dominant masses left breast [No Nipple Discharge] : no left nipple discharge [No Ulceration] : no ulceration [Breast Nipple Inversion] : nipples not inverted [Breast Nipple Retraction] : nipples not retracted [de-identified] : well healed surgical scars. [de-identified] : No axillary lymphadenopathy appreciated. [de-identified] : No axillary lymphadenopathy appreciated. [de-identified] : slight IMF fungal infection.

## 2021-06-18 NOTE — HISTORY OF PRESENT ILLNESS
[FreeTextEntry1] : PMD: Dr. Eliazar Blood\par GYN: Dr. Areli Richardson\par \par Patient with Right breast classical type LCIS on stereotactic core biopsy 9/9/13; 10:00 N10, 12 mm.\par Left breast hyalinized fibroadenoma, ALH on stereotactic core biopsy 9/9/13; 7:00 N7, 2 mm.\par Left breast fibroadenoma with calcifications on stereotactic core biopsy 9/12/13; Lateral.\par Right breast intraductal papilloma on ultrasound guided core biopsy 10/16/13; 9:00 N1.  \par Right adipose breast tissue showing nodular stromal sclerosis on ultrasound guided core biopsy 10/16/13; 1:00 N2.  \par Bilateral axillary FNA 10/16/13 demonstrating lymphocytes and macrophages.\par Left breast cystic changes with focal duct hyperplasia usual type, sclerosing adenosis on MR guided core biopsy 10/23/13; 12:00.  \par \par Her family history is significant for her maternal aunt with breast cancer at 70; and her father with colon cancer.  \par Her Pepper Model risk assessment is:\par 4.0 % in five years \par 17.1 % in her lifetime; recalculated 7/16/20.\par She refuses high risk screening with bilateral breast MRI due to the inability to lie prone on the MRI table.  \par \par Status post Right NLOC of two areas, Left NLOC 12/2/13 demonstrating Right 9:00 LCIS classical type, ALH, radial sclerosing lesion; Right 10:00 LCIS classical and fibrocystic changes; Left focal ADH, micropapillary and columnar cell involving a radial scar.\par \par Right breast LCIS classical type on stereotactic biopsy core 3/15/18; 8:00 (buckle).\par Status post Right NLOC of two areas 05/21/18 demonstrating anterior mass focal LCIS classical type and LCIS and posterior mass with ADH, classical type LCIS and ALH.  \par \par Patient with left breast calcified fibroadenomatoid changes with focal Atypical Ductal Hyperplasia (ADH) on stereotactic guided biopsy on 5/27/20; 7:00 (buckle) \par \par Was on Tamoxifen for five years with Dr. Yusuf, switched to Raloxifene in January 2020.  \par \par s/p Left breast NLOC - 08/17/2020 = Minute residual focus of atypical ductal hyperplasia (ADH). Previous biopsy site changes. Focus of atypical lobular hyperplasia (ALH). Focal calcifications. Fibrocystic changes.\par \par \par GABRIELE CHANDLER is a 60 year old female patient who presents today in follow up for history of LCIS, ALH, ADH, radial scar.\par Since her last visit, she has no new breast related complaints. \par Imaging was done on 06/15/2021, which revealed there is no mammographic evidence of malignancy.\par \par She presents today for evaluation and imaging review.

## 2021-06-18 NOTE — DATA REVIEWED
[FreeTextEntry1] : B/L Dx Mammo - 06/15/2021:\par Tomosynthesis and 2D imaging of the breast(s) were performed.  Current study was also evaluated with a computer aided detection (CAD) system.\par  \par Breast composition: There are scattered areas of fibroglandular density.\par  \par No significant masses, calcifications, or other findings are seen in either breast. \par  \par The patient has new scarring in the 6-7 o'clock left breast, site of surgical excision following high risk results after stereotactic biopsy on 5/27/2000.\par  \par There are biopsy clips in both breasts\par  \par IMPRESSION: \par  \par There is no mammographic evidence of malignancy. \par  \par A 1 year screening mammogram of both breast(s) is recommended. The patient will be sent a normal letter.\par  \par The findings and recommendations were discussed with the patient.\par  \par BI-RADS 2: BENIGN

## 2021-06-18 NOTE — ASSESSMENT
[FreeTextEntry1] : GABRIELE is a ozzie 60 year old patient who presented today in follow up for a history of LCIS, ALH, ADH, radial scar.  \par She has been doing well with no new breast related complaints. \par Imaging was done recently which revealed there is no mammographic evidence of malignancy, as detailed above. \par Physical exam was unrevealing today.\par \par As per pt, she will be relocating to [place] as of July/August 2021.\par She was instructed to obtain her previous three years worth of imaging prior to leaving.\par She should establish care with a breast surgeon and continue clinical breast exams as well as imaging.\par \par Rx for Ketoconazole cream will be electronically prescribed for IMF fungal rash.\par \par I spent a total of 15 minutes of face to face time with this patient, greater than 50% of which was spent in counseling and/or coordination of care.\par All of her questions were appropriately answered.\par She knows to call with any concerns.

## 2021-07-23 ENCOUNTER — APPOINTMENT (OUTPATIENT)
Dept: HEMATOLOGY ONCOLOGY | Facility: CLINIC | Age: 61
End: 2021-07-23
Payer: COMMERCIAL

## 2021-07-23 ENCOUNTER — OUTPATIENT (OUTPATIENT)
Dept: OUTPATIENT SERVICES | Facility: HOSPITAL | Age: 61
LOS: 1 days | Discharge: HOME | End: 2021-07-23

## 2021-07-23 VITALS
HEIGHT: 61 IN | WEIGHT: 284 LBS | TEMPERATURE: 98.1 F | HEART RATE: 72 BPM | BODY MASS INDEX: 53.62 KG/M2 | SYSTOLIC BLOOD PRESSURE: 139 MMHG | DIASTOLIC BLOOD PRESSURE: 91 MMHG

## 2021-07-23 DIAGNOSIS — Z98.890 OTHER SPECIFIED POSTPROCEDURAL STATES: Chronic | ICD-10-CM

## 2021-07-23 DIAGNOSIS — N60.99 UNSPECIFIED BENIGN MAMMARY DYSPLASIA OF UNSPECIFIED BREAST: ICD-10-CM

## 2021-07-23 DIAGNOSIS — Z79.810 ENCOUNTER FOR THERAPEUTIC DRUG LVL MONITORING: ICD-10-CM

## 2021-07-23 DIAGNOSIS — Z90.89 ACQUIRED ABSENCE OF OTHER ORGANS: Chronic | ICD-10-CM

## 2021-07-23 DIAGNOSIS — D05.01 LOBULAR CARCINOMA IN SITU OF RIGHT BREAST: ICD-10-CM

## 2021-07-23 DIAGNOSIS — R16.2 HEPATOMEGALY WITH SPLENOMEGALY, NOT ELSEWHERE CLASSIFIED: ICD-10-CM

## 2021-07-23 DIAGNOSIS — D69.6 THROMBOCYTOPENIA, UNSPECIFIED: ICD-10-CM

## 2021-07-23 DIAGNOSIS — Z51.81 ENCOUNTER FOR THERAPEUTIC DRUG LVL MONITORING: ICD-10-CM

## 2021-07-23 LAB
HCT VFR BLD CALC: 46.3 %
HGB BLD-MCNC: 16.9 G/DL
MCHC RBC-ENTMCNC: 30.4 PG
MCHC RBC-ENTMCNC: 36.5 G/DL
MCV RBC AUTO: 83.3 FL
PLATELET # BLD AUTO: 90 K/UL
PMV BLD: 10.2 FL
RBC # BLD: 5.56 M/UL
RBC # FLD: 13.6 %
WBC # FLD AUTO: 6.96 K/UL

## 2021-07-23 PROCEDURE — 99214 OFFICE O/P EST MOD 30 MIN: CPT

## 2021-07-26 ENCOUNTER — RESULT REVIEW (OUTPATIENT)
Age: 61
End: 2021-07-26

## 2021-07-26 ENCOUNTER — TRANSCRIPTION ENCOUNTER (OUTPATIENT)
Age: 61
End: 2021-07-26

## 2021-07-26 ENCOUNTER — OUTPATIENT (OUTPATIENT)
Dept: OUTPATIENT SERVICES | Facility: HOSPITAL | Age: 61
LOS: 1 days | Discharge: HOME | End: 2021-07-26
Payer: COMMERCIAL

## 2021-07-26 VITALS
SYSTOLIC BLOOD PRESSURE: 118 MMHG | RESPIRATION RATE: 18 BRPM | HEART RATE: 69 BPM | OXYGEN SATURATION: 99 % | DIASTOLIC BLOOD PRESSURE: 56 MMHG

## 2021-07-26 VITALS
WEIGHT: 279.99 LBS | DIASTOLIC BLOOD PRESSURE: 56 MMHG | HEIGHT: 62 IN | HEART RATE: 71 BPM | TEMPERATURE: 97 F | SYSTOLIC BLOOD PRESSURE: 107 MMHG | RESPIRATION RATE: 18 BRPM

## 2021-07-26 DIAGNOSIS — Z90.89 ACQUIRED ABSENCE OF OTHER ORGANS: Chronic | ICD-10-CM

## 2021-07-26 DIAGNOSIS — Z98.890 OTHER SPECIFIED POSTPROCEDURAL STATES: Chronic | ICD-10-CM

## 2021-07-26 LAB — GLUCOSE BLDC GLUCOMTR-MCNC: 132 MG/DL — HIGH (ref 70–99)

## 2021-07-26 PROCEDURE — 88305 TISSUE EXAM BY PATHOLOGIST: CPT | Mod: 26

## 2021-07-26 RX ORDER — RALOXIFENE HYDROCHLORIDE 60 MG/1
1 TABLET, COATED ORAL
Qty: 0 | Refills: 0 | DISCHARGE

## 2021-07-26 RX ORDER — METOPROLOL TARTRATE 50 MG
1 TABLET ORAL
Qty: 0 | Refills: 0 | DISCHARGE

## 2021-07-26 RX ORDER — UBIDECARENONE 100 MG
1 CAPSULE ORAL
Qty: 0 | Refills: 0 | DISCHARGE

## 2021-07-26 RX ORDER — METFORMIN HYDROCHLORIDE 850 MG/1
2 TABLET ORAL
Qty: 0 | Refills: 0 | DISCHARGE

## 2021-07-26 RX ORDER — PREGABALIN 225 MG/1
0 CAPSULE ORAL
Qty: 0 | Refills: 0 | DISCHARGE

## 2021-07-26 RX ORDER — CHOLECALCIFEROL (VITAMIN D3) 125 MCG
0 CAPSULE ORAL
Qty: 0 | Refills: 0 | DISCHARGE

## 2021-07-26 NOTE — ASU PATIENT PROFILE, ADULT - PMH
Chronic lower back pain  SCIATICA, PAIN RADIATING TO B/L LE  Cirrhosis of liver    DM (diabetes mellitus)    HTN (hypertension)    Lobular carcinoma in situ (LCIS) of right breast    Nonalcoholic fatty liver disease without nonalcoholic steatohepatitis (GALEAS)    OA (osteoarthritis)    Obesity  bmi 49  Smoker    Venous insufficiency  ? CHR ULCER RT LE-resolved

## 2021-07-26 NOTE — CHART NOTE - NSCHARTNOTEFT_GEN_A_CORE
PACU ANESTHESIA ADMISSION NOTE      Procedure:   Post op diagnosis:      ____  Intubated  TV:______       Rate: ______      FiO2: ______    __x__  Patent Airway    __x__  Full return of protective reflexes    __x__  Full recovery from anesthesia / back to baseline     Vitals:   T:  97.6         R:   16               BP:    118/63              Sat:   99%                P: 77      Mental Status:  __x__ Awake   __x___ Alert   _____ Drowsy   _____ Sedated    Nausea/Vomiting:  __x__ NO  ______Yes,   See Post - Op Orders          Pain Scale (0-10):  __0___    Treatment: ____ None    ___x_ See Post - Op/PCA Orders    Post - Operative Fluids:   ____ Oral   __x__ See Post - Op Orders    Plan: Discharge:   __x__Home       _____Floor     _____Critical Care    _____  Other:_________________    Comments:  pt tolerated procedure well, pt transferred to PACU and report was given to PACU RN, vital signs are stable and pt shows no signs of distress. no anesthesia complications, discharge pt when criteria met.

## 2021-07-27 PROBLEM — R16.2 HEPATOSPLENOMEGALY: Status: ACTIVE | Noted: 2019-06-18

## 2021-07-27 PROBLEM — Z51.81 ENCOUNTER FOR MONITORING RALOXIFENE THERAPY: Status: ACTIVE | Noted: 2020-01-25

## 2021-07-27 PROBLEM — N60.99 ATYPICAL DUCTAL HYPERPLASIA OF BREAST: Status: ACTIVE | Noted: 2018-05-29

## 2021-07-27 PROBLEM — D05.01 LOBULAR CARCINOMA IN SITU (LCIS) OF RIGHT BREAST: Status: ACTIVE | Noted: 2017-04-04

## 2021-07-27 PROBLEM — D69.6 THROMBOCYTOPENIA: Status: ACTIVE | Noted: 2019-05-30

## 2021-07-27 NOTE — ASSESSMENT
[FreeTextEntry1] : ASSESSMENT:  \par -- Hepatosplenomegaly and thrombocytopenia, likely due to non alcoholic steatohepatitis (GALEAS) and liver cirrhosis.  BM biopsy on 11/20/19- showed Hypocellular bone marrow (20%) with maturing trilineage hematopoiesis. No dysplasia or blasts were seen. Routine cytogenetic and FISH for MDS panel were normal.\par -- LCIS right breast, s/p lumpectomy in 2013 and 2018\par -- ADH and ALH of  left breast s/p lumpectomy in Aug 2020 \par -- BCC skin LE\par \par PLAN: \par -- Continue Raloxifene daily for breast cancer prevention.\par -- Continue annual screening mammo and breast. \par -- Chronic thrombocytopenia. PLT count has been stable. Continue observation.\par -- Followup with gastroenterologist Dr. Carrillo for GALEAS. Encouraged healthy diet and weight reduction.  \par -- She is moving to [place]. Will followup with a hematology/oncologist at her destination. Advised to get her medical record. \par \par \par

## 2021-07-27 NOTE — PHYSICAL EXAM
[Restricted in physically strenuous activity but ambulatory and able to carry out work of a light or sedentary nature] : Status 1- Restricted in physically strenuous activity but ambulatory and able to carry out work of a light or sedentary nature, e.g., light house work, office work [Obese] : obese [Normal] : affect appropriate [de-identified] : Right breast lumpectomy scar, There is no palpable mass or skin lesion. Lt breast - oozing of serosanguineous discharge of Lt breast scar site . No palpable masses [de-identified] : liver edge palpated

## 2021-07-27 NOTE — HISTORY OF PRESENT ILLNESS
[de-identified] : 58-year-old female with a history of lobular carcinoma in situ of the right breast and focal atypical ductal hyperplasia of the left breast status post bilateral lumpectomy.  She has been on tamoxifen since 12/2013 and tolerated well.  She is menopausal.  On 08/02/2016, she had a bilateral breast MRI with and without contrast at [facility].  There was no abnormal finding.  On 01/08/2016, she had a bilateral screening mammogram.  There was no abnormal finding.\par  [de-identified] : The patient had a screening mammogram on 3/8/2018 where calcifications were identified within the 8:00 axis of the right breast at posterior one third depth. \par \par She was subsequently underwent a diagnostic mammogram performed on the same day. Spot magnification views demonstrated a 0.5 cm of grouped heterogeneous \par calcifications within the right breast at 8:00, posterior one third depth for which a stereotactic biopsy was recommended. \par \par A stereotactic biopsy performed on 3/15/2018 demonstrated the pathology results of LCIS. At the time of the biopsy, a buckle shaped biopsy marking clip was deployed. Post procedural mammogram is suboptimal in the CC projection due to positioning, making it difficult to determine if the clip is in appropriate position relative to the target site. Repeat CC \par recommended prior to needle localization. \par \par Additionally, upon evaluation of prior mammogram dated 3/8/2018, there are grouped coarse heterogeneous calcifications spanning 0.3 cm within the upper \par outer quadrant of the right breast at middle one third depth for which stereotactic biopsy is recommended for further evaluation \par Recommendations/Contemplated Plan of Action: \par 1. Indeterminate right breast upper outer quadrant calcifications for which \par stereotactic biopsy is recommended. \par 2. A mammographically guided wire localization of the buckle shaped clip \par may be performed on the day of surgery, pending repeat CC view to determine \par if the clip is in appropriate position relative to the target site. \par \par On 5/21/18, she had right breast lumpectomy. HISTOLOGY: FINAL DIAGNOSIS \par 1. BREAST, RIGHT ANTERIOR MASS, NEEDLE LOCALIZED LUMPECTOMY: \par - FOCAL LOBULAR CARCINOMA IN-SITU (LCIS), CLASSICAL TYPE AND ATYPICAL \par LOBULAR HYPERPLASIA (ALH); ASSOCIATED WITH RARE MICROCALCIFICATIONS. \par - ATROPHIC FATTY BREAST TISSUE WITH MILDLY PROLIFERATIVE TYPE FIBROCYTIC \par CHANGES, FOCAL MILD VASCULAR MEDIAL CALCIFIC SCLEROSIS (MONCKEBERG'S), AND A \par HEALING PRIOR BIOPSY SITE ASSOCIATED WITH ORGANIZING HEMATOMA FORMATION. \par \par 2. BREAST, RIGHT POSTERIOR MASS, NEEDLE LOCALIZED LUMPECTOMY: \par - SINGLE MICROSCOPIC FOCUS OF ATYPICAL DUCT HYPERPLASIA (ADH), SOLID TYPE, \par 0.8 MM, LOCATED 2.0 MM FROM THE NEAREST INFERIOR INKED SURGICAL MARGIN \par (MICROSCOPIC MEASUREMENTS). \par - FOCAL LOBULAR CARCINOMA IN-SITU (LCIS), CLASSICAL TYPE AND ATYPICAL \par LOBULAR HYPERPLASIA (ALH). \par - ATROPHIC FATTY BREAST TISSUE WITH PROLIFERATIVE TYPE \par FIBROCYSTIC CHANGES, FOCAL MAMMARY DUCT ECTASIA, AND A HEALING PRIOR BIOPSY \par SITE. \par \par 1/10/19:\par The patient is here for f/u visit. She is taking tamoxifen for LCIS since 12/2013. She had right breast lumpectomy x 2 in 5/2018 which showed LCIS, classical type and ALH. She complains b/l leg pain more when she is walking and better when her legs were elevated. She had endometrial biopsy and D&C in 6 2018. There was no evidence of malignancy.\par \par 5/30/19\par Patient is here today for follow up visit.  She started tamoxifen for LCIS in 12/2013 x 3 years, then restarted it again in 5/2018 after right breast lumpectomy which showed LCIS, classical type and ALH. She offers no new breast complaints.  However, she had another D&C done on 4/26/19 for thickened endometrial lining and possible polyp.  Low platelets were noted on pre-op, plt= 88 4/10/19, repeated plt=78  on 5/3/19.  She has been taking aspirin for 20 years.  No new meds were added.\par \par 8/21/19\par Patient is here today for follow up visit, She was found to have new onset thrombocytopenia, 70-80K. She discontinued her aspirin since last visit 3 months ago. Labs reviewed. Elevated BPU=834, Haptoglobin <20. She had abdominal ultrasound on 6/10/19 which showed hepatomegaly and splenomegaly followed by MRI of abdomen on 8/2/19 which showed hepatomegaly 20.3 cm with diffuse fat disposition and cirrhosis.  No focal lesion. Splenomegaly 18.2 cm without focal lesions but splenic varices present, some of which drain to the left renal vein.  Cholelithiasis with multiple small gallstones. No abdominal ascites.  No abdominal adenopathy. She has her next appt with GI, Dr. Leal on 9/16/19.\par She was started tamoxifen for LCIS in 12/2013 x 3 years, then restarted it again in 5/2018 after right breast lumpectomy which showed LCIS, classical type and ALH. Last mammogram was done 5/1/19 which showed no evidence of malignancy.\par \par 10/25/19:\par Patient is here today for follow up visit, She was found to have new onset thrombocytopenia, 70-80K, elevated AHL=168, Haptoglobin <20, Eloina negative. She had abdominal ultrasound on 6/10/19 which showed hepatomegaly and splenomegaly followed by MRI of abdomen on 8/2/19 which showed hepatomegaly 20.3 cm with diffuse fat disposition and cirrhosis.  No focal lesion. Splenomegaly 18.2 cm without focal lesions but splenic varices present.  No abdominal adenopathy. \par She had liver biopsy on 10/3/19 which showed Liver, needle biopsy specimen shows micronodular cirrhosis withmoderate macrovesicular steatosis and mild lobular lymphocytic infiltration. Ballooned hepatocytes are rarely observed. Beth-Denk hyalins are not seen. The cirrhotic nodules are  by broad areas of multiacinar fibrosis replacing extinct parenchyma.The fibrotic areas, confirmed by trichrome stain, have mildductular reaction an mild lymphocytic infiltration. Iron stain is negative.\par Findings are consistent with alcoholic and/or non-alcoholic steatohepatitis with moderate steatosis, mild activity (grade 1of 3), and cirrhosis (stage 4 of 4).\par \par She has a history of LCIS right breast, s/p lumpectomy in 2013 and 2018, ADH left breast. She was on tamoxifen for LCIS in 12/2013 x 3 years, then restarted it again in 5/2018 after right breast lumpectomy which showed LCIS, classical type and ALH. Last mammogram was done 5/1/19 which showed no evidence of malignancy.\par \par 1/24/2020:\par Patient is here today for follow up visit, She was found to have thrombocytopenia, 70-80K, elevated RJL=003, Haptoglobin <20, Eloina negative. She had abdominal ultrasound on 6/10/19 which showed hepatomegaly and splenomegaly followed by MRI of abdomen on 8/2/19 which showed hepatomegaly 20.3 cm with diffuse fat disposition and cirrhosis.  No focal lesion. Splenomegaly 18.2 cm without focal lesions but splenic varices present.  No abdominal adenopathy. \par She had liver biopsy on 10/3/19 which showed micronodular cirrhosis with moderate macrovesicular steatosis and mild lobular lymphocytic infiltration. Ballooned hepatocytes are rarely observed. Beth-Denk hyalins are not seen. The cirrhotic nodules are  by broad areas of multiacinar fibrosis replacing extinct parenchyma.The fibrotic areas, confirmed by trichrome stain, have mildductular reaction an mild lymphocytic infiltration. Iron stain is negative.\par Findings are consistent with alcoholic and/or non-alcoholic steatohepatitis with moderate steatosis, mild activity (grade 1of 3), and cirrhosis (stage 4 of 4).\par \par To further evaluate etiology of thrombocytopenia, she had BM biopsy on 11/20/19 which showed Hypocellular bone marrow (20%) with maturing trilineage hematopoiesis. There was no dysplastic change and no increase blasts. FISH MDS panel and routine cytogenetics were normal. \par \par She has been followed by her gastroenterologist. She is on weight watch diet and has lost > 10 pounds. \par \par She has a history of LCIS right breast, s/p lumpectomy in 2013 and 2018, ADH left breast. She was on tamoxifen for LCIS in 12/2013 x 3 years, then restarted it again in 5/2018 after right breast lumpectomy which showed LCIS, classical type and ALH. Due to liver problems, she stopped tamoxifen and switched to Raloxifene. Last mammogram was done 5/1/19 which showed no evidence of malignancy.\par \par 7/24/2020:\par The patient is here for followup visit. She had LCIS right breast, s/p lumpectomy in 2013 and 2018 and ADH left breast. She was on Tamoxifen initially and switched to Raloxifene  due to liver problem. She was found to have hepatosplenomegaly and thrombocytopenia, likely due to non alcoholic steatohepatitis (GALEAS) and liver cirrhosis. She had BM biopsy on 11/20/19 which showed Hypocellular bone marrow (20%) with maturing trilineage hematopoiesis. There was no dysplastic change and no increase blasts. FISH MDS panel and routine cytogenetics were normal. The liver biopsy on 10/3/19 showed micronodular cirrhosis with moderate macrovesicular steatosis and mild lobular lymphocytic infiltration. Ballooned hepatocytes are rarely observed. Beth-Denk hyalins are not seen. The cirrhotic nodules are  by broad areas of multiacinar fibrosis replacing extinct parenchyma.The fibrotic areas, confirmed by trichrome stain, have mildductular reaction an mild lymphocytic infiltration. Iron stain is negative. Findings are consistent with alcoholic and/or non-alcoholic steatohepatitis with moderate steatosis, mild activity (grade 1of 3), and cirrhosis (stage 4 of 4).\par On 5/22/2020, she had b/l screening mammo which showed indeterminate calcifications in the left breast. On 5/27/2020, stereotactic guided biopsy showed calcified fibroadnematoid changes with focal atypical ductal hyperplasia. \par \par 1/22/21- Patient is here for follow up. She is doing well overall. She had lumpectomy of Lt breast in Aug 2020 and Bx showed ADH and ALH. She continues with Raloxifene. Tolerating well. No issues. She has had on and off oozing from the Sx site on the left breast that resolves on its own. It started oozing 3 days ago- fluid stained with pinkish stain \par \par 7/2321:\par Rosario is here today for followup visit. She had LCIS right breast, s/p lumpectomy in 2013 and 2018 and ADH and ALH of  left breast s/p lumpectomy in Aug 2020. She has been taking raloxifene for breast cancer risk reduction. On 6/15/21, she had b/l dx mammo and there no abnormal finding. \par She has been also followed up here for chronic thrombocytopenia, 70-80K, elevated WFW=062, Haptoglobin <20, Eloina negative. She had abdominal ultrasound on 6/10/19 which showed hepatomegaly and splenomegaly followed by MRI of abdomen on 8/2/19 which showed hepatomegaly 20.3 cm with diffuse fat disposition and cirrhosis.  No focal lesion. Splenomegaly 18.2 cm without focal lesions but splenic varices present.  No abdominal adenopathy. She had liver biopsy on 10/3/19 which showed micronodular cirrhosis with moderate macrovesicular steatosis and mild lobular lymphocytic infiltration. Findings were consistent with alcoholic and/or non-alcoholic steatohepatitis with moderate steatosis, mild activity (grade 1of 3), and cirrhosis (stage 4 of 4).\par On 11/20/19, she had BM biopsy which showed hypocellular marrow (20%) with maturing trilineage hematopoiesis. Flow cytometry dis not show diagnostic abnormality. The routine cytogenetic showed normal female karyotype and FISH for MDS panel was normal. She has been kept on observation and her PLT remained stable.

## 2021-07-28 DIAGNOSIS — Z51.81 ENCOUNTER FOR THERAPEUTIC DRUG LEVEL MONITORING: ICD-10-CM

## 2021-07-28 DIAGNOSIS — D05.01 LOBULAR CARCINOMA IN SITU OF RIGHT BREAST: ICD-10-CM

## 2021-07-28 DIAGNOSIS — R16.2 HEPATOMEGALY WITH SPLENOMEGALY, NOT ELSEWHERE CLASSIFIED: ICD-10-CM

## 2021-07-28 DIAGNOSIS — N60.99 UNSPECIFIED BENIGN MAMMARY DYSPLASIA OF UNSPECIFIED BREAST: ICD-10-CM

## 2021-07-28 DIAGNOSIS — D69.6 THROMBOCYTOPENIA, UNSPECIFIED: ICD-10-CM

## 2021-07-28 LAB — SURGICAL PATHOLOGY STUDY: SIGNIFICANT CHANGE UP

## 2021-07-29 DIAGNOSIS — I10 ESSENTIAL (PRIMARY) HYPERTENSION: ICD-10-CM

## 2021-07-29 DIAGNOSIS — K74.60 UNSPECIFIED CIRRHOSIS OF LIVER: ICD-10-CM

## 2021-07-29 DIAGNOSIS — D05.10 INTRADUCTAL CARCINOMA IN SITU OF UNSPECIFIED BREAST: ICD-10-CM

## 2021-07-29 DIAGNOSIS — Z12.11 ENCOUNTER FOR SCREENING FOR MALIGNANT NEOPLASM OF COLON: ICD-10-CM

## 2021-07-29 DIAGNOSIS — M54.5 LOW BACK PAIN: ICD-10-CM

## 2021-07-29 DIAGNOSIS — E11.9 TYPE 2 DIABETES MELLITUS WITHOUT COMPLICATIONS: ICD-10-CM

## 2021-07-29 DIAGNOSIS — F17.200 NICOTINE DEPENDENCE, UNSPECIFIED, UNCOMPLICATED: ICD-10-CM

## 2021-07-29 DIAGNOSIS — E66.9 OBESITY, UNSPECIFIED: ICD-10-CM

## 2021-07-29 DIAGNOSIS — K64.8 OTHER HEMORRHOIDS: ICD-10-CM

## 2022-02-23 NOTE — REASON FOR VISIT
[Follow-Up: _____] : a [unfilled] follow-up visit Spot Size: 2 x 2 cm Post-Care Instructions: I reviewed with the patient in detail post-care instructions. Patient should stay away from the sun and wear sun protection until treated areas are fully healed. Device Serial Number (Optional): [id number] Treatment Number: 12 Mode: tile Location #1: scalp Detail Level: Detailed Fluence #1 (J/Cm2 Or Mj/Cm2): 1560 mj Total Square Area In Cm2 (Required For Proper Billing- Whole Numbers Only Please): [id number] Consent: Written consent obtained, risks reviewed including but not limited to crusting, scabbing, blistering, scarring, darker or lighter pigmentary change, incidental hair removal, bruising, and/or incomplete removal. Fluence Units: mJ/cm2

## 2023-04-03 NOTE — H&P PST ADULT - PRIMARY CARE PROVIDER
Endocrine staff,   Please notify pt that I have reviewed your recent results.      Great!, all results including thyroid, vitamin B12, Vitamin D,and cortisol level are very good , are within an acceptable range of normal. Thyroid antibodies are also negative     Your current fatigue unlikely to be form the thyroid pmd:olivia(lv3 /20)

## 2023-05-19 NOTE — ASU DISCHARGE PLAN (ADULT/PEDIATRIC) - DATE OF LAST VACCINATION
Additional Notes: Patient completed some topical application of FU cream to L cheek.  There is still some residual. I would like him to repeat it. \\n\\nWritten instructions given. AEs reviewed.\\n\\nPatient will continue to use 5FU for another 2 weeks. On left cheek.\\nPatient will follow up at next fbse 4 months Detail Level: Simple Render Risk Assessment In Note?: no Patient Management Risk Assessment: Moderate Additional Notes: Advised patient to let area heal on right cheek.  Will recheck next visit. Additional Notes: Advised patient to continue BETAMETHASONE treatment on area on right arm.\\n\\nCCBs occasionally cause a dermatitis.  If remains unresponsive, may biopsy and suggest trial off [id number]. 08-Apr-2021

## 2024-01-22 NOTE — H&P PST ADULT - NSSUBSTANCEUSE_GEN_ALL_CORE_SD
At goal.  Counseled of low sodium diet, exercise, adherence with treatment plan.  Complications of uncontrolled HTN explained (i.e CVA, MI, ESRD, CHF, etc) explained.     
No myalgia reported on statin.  Reinforced low fat/chol diet; cardio exercises, compliance with treatment plan.  Follow lipid, LFT's.   
Reinforced low sweets/carb diet, exercise, avoid weight gain to delay progression to type 2 DM. Check A1C   
never used
